# Patient Record
Sex: MALE | Race: WHITE | NOT HISPANIC OR LATINO | Employment: OTHER | URBAN - METROPOLITAN AREA
[De-identification: names, ages, dates, MRNs, and addresses within clinical notes are randomized per-mention and may not be internally consistent; named-entity substitution may affect disease eponyms.]

---

## 2020-05-12 ENCOUNTER — OFFICE VISIT (OUTPATIENT)
Dept: ORTHOPEDIC SURGERY | Facility: CLINIC | Age: 60
End: 2020-05-12

## 2020-05-12 VITALS — OXYGEN SATURATION: 100 % | HEIGHT: 69 IN | HEART RATE: 68 BPM | BODY MASS INDEX: 31.28 KG/M2 | WEIGHT: 211.2 LBS

## 2020-05-12 DIAGNOSIS — M65.312 TRIGGER FINGER OF LEFT THUMB: ICD-10-CM

## 2020-05-12 DIAGNOSIS — M79.642 BILATERAL HAND PAIN: Primary | ICD-10-CM

## 2020-05-12 DIAGNOSIS — M65.352 TRIGGER LITTLE FINGER OF LEFT HAND: ICD-10-CM

## 2020-05-12 DIAGNOSIS — M79.641 BILATERAL HAND PAIN: Primary | ICD-10-CM

## 2020-05-12 DIAGNOSIS — M19.042 PRIMARY OSTEOARTHRITIS OF BOTH HANDS: ICD-10-CM

## 2020-05-12 DIAGNOSIS — M19.041 PRIMARY OSTEOARTHRITIS OF BOTH HANDS: ICD-10-CM

## 2020-05-12 PROCEDURE — 99203 OFFICE O/P NEW LOW 30 MIN: CPT | Performed by: PHYSICIAN ASSISTANT

## 2020-05-12 RX ORDER — LEVOTHYROXINE SODIUM 0.05 MG/1
TABLET ORAL DAILY
COMMUNITY
Start: 2017-01-10 | End: 2021-09-03 | Stop reason: SDUPTHER

## 2020-05-12 RX ORDER — LISINOPRIL 20 MG/1
20 TABLET ORAL DAILY
COMMUNITY
End: 2021-02-15 | Stop reason: SINTOL

## 2020-05-12 RX ORDER — FAMOTIDINE 20 MG/1
20 TABLET, FILM COATED ORAL DAILY
COMMUNITY
Start: 2020-05-03 | End: 2022-03-28

## 2020-05-12 RX ORDER — IBUPROFEN 200 MG
200 TABLET ORAL EVERY 6 HOURS PRN
COMMUNITY
End: 2022-03-28

## 2020-05-12 RX ORDER — ASPIRIN 81 MG/1
81 TABLET, CHEWABLE ORAL DAILY
COMMUNITY

## 2020-05-12 RX ORDER — ROSUVASTATIN CALCIUM 10 MG/1
TABLET, COATED ORAL
COMMUNITY
Start: 2018-07-16 | End: 2020-11-30 | Stop reason: SDUPTHER

## 2020-05-12 NOTE — PROGRESS NOTES
[facility] Note    Subjective     Chief Complaint   Patient presents with   • Right Hand - Pain   • Left Hand - Pain        HPI  Alfredo Oconnor is a 60 y.o. male.  Right-hand-dominant.  Patient comes in complaining of bilateral hand pain.  He states it has been ongoing for over 6 months.  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  Since finishing the construction work, he reports his symptoms have improved.  At this time he endorses a pain scale 3/10.  Severity the pain mild to moderate.  Quality the pain dull.  Associated symptoms intermittent swelling, popping of joints, some catching/triggering to left little finger and intermittently left thumb.  No reported numbness or tingling into the extremities or digits.  Patient does take ibuprofen as needed.    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    Patient reports history of diabetes and states his A1c is 8.5.  He quit smoking in .    No reported fever, chills, night sweats or other constitutional symptoms.    Past Medical History:   Diagnosis Date   • Diabetes (CMS/[facility])       Past Surgical History:   Procedure Laterality Date   • CATARACT EXTRACTION     • VASECTOMY        History reviewed. No pertinent family history.  Social History     Socioeconomic History   • Marital status:      Spouse name: Not on file   • Number of children: Not on file   • Years of education: Not on file   • Highest education level: Not on file   Tobacco Use   • Smoking status: Former Smoker     Start date:      Last attempt to quit:      Years since quittin.3   • Smokeless tobacco: Never Used   Substance and Sexual Activity   • Alcohol use: Yes     Comment: occasional   • Drug use: Never   • Sexual activity: Defer      Current Outpatient Medications on File Prior to Visit   Medication  "Sig Dispense Refill   • aspirin 81 MG chewable tablet Chew 81 mg Daily.     • famotidine (PEPCID) 20 MG tablet Take 20 mg by mouth Daily.     • glucose blood (ONE TOUCH ULTRA TEST) test strip 1 strip 4 (Four) Times a Day.     • ibuprofen (ADVIL,MOTRIN) 200 MG tablet Take 200 mg by mouth Every 6 (Six) Hours As Needed for Mild Pain .     • levothyroxine (SYNTHROID, LEVOTHROID) 50 MCG tablet      • lisinopril (PRINIVIL,ZESTRIL) 20 MG tablet Take 20 mg by mouth Daily.     • NOVOLOG 100 UNIT/ML injection INJECT AS DIRECTED IN INSULIN PUMP MAXIMUM  UNITS PER DAY.     • rosuvastatin (CRESTOR) 10 MG tablet TAKE 1 TABLET BY MOUTH ONCE DAILY     • VITAMIN D PO Take  by mouth.       No current facility-administered medications on file prior to visit.       No Known Allergies     The following portions of the patient's history were reviewed and updated as appropriate: allergies, current medications, past family history, past medical history, past social history, past surgical history and problem list.    Review of Systems   Constitutional: Positive for fatigue.   HENT: Positive for hearing loss.    Eyes: Negative.    Respiratory: Negative.    Cardiovascular: Negative.    Gastrointestinal: Negative.    Endocrine: Negative.    Genitourinary: Negative.    Musculoskeletal: Positive for arthralgias and back pain.   Skin: Negative.    Allergic/Immunologic: Negative.    Neurological: Negative.    Hematological: Negative.    Psychiatric/Behavioral: Negative.         Objective      Physical Exam  Pulse 68   Ht 175.3 cm (69\")   Wt 95.8 kg (211 lb 3.2 oz)   SpO2 100%   BMI 31.19 kg/m²     Body mass index is 31.19 kg/m².    GENERAL APPEARANCE: awake, alert & oriented x 3, in no acute distress and well developed, well nourished  PSYCH: normal mood and affect  LUNGS:  breathing nonlabored, no wheezing  EYES: sclera anicteric, pupils equal  CARDIOVASCULAR: palpable radial pulses bilaterally. Capillary refill less than 2 " seconds  INTEGUMENTARY: skin intact, no clubbing, cyanosis  NEUROLOGIC:  Normal Sensation         Ortho Exam  Peripheral Vascular   Bilateral Upper Extremity    No cyanotic nail beds    Pink nail beds and rapid capillary refill   Palpation    Radial Pulse - Bilaterally normal    Neurologic   Sensory: Light touch intact- Right and left hand    Left Upper Extremity    Left wrist extensors: 5/5    Left wrist flexors: 5/5    Left intrinsics: 5/5   Right Upper Extremity    Right wrist extensors: 5/5    Right wrist flexors: 5/5    Right intrinsics: 5/5    Musculoskeletal   Left Elbow    Forearm supination: AROM - 75 degrees    Forearm pronation: AROM - 75 degrees   Right Elbow    Forearm supination: AROM - 75 degrees    Forearm pronation: AROM - 75 degrees     Inspection and Palpation   Right Wrist      Tenderness -generalized tenderness noted throughout wrist into digits.    Swelling - none    Crepitus - none    Muscle tone - no atrophy   Left Wrist    Tenderness - generalized tenderness noted throughout wrist into digits.    Swelling - none    Crepitus - none    Muscle tone - no atrophy     ROJM:   Left Wrist    Flexion: AROM - 65 degrees    Extension: AROM - 65 degrees   Right Wrist    Flexion: AROM - 65 degrees    Extension: AROM - 65 degrees     Deformities, Malalignments, Discrepancies    None     Functional Testing   Right Wrist    Tinel's Sign negative    Phalen's Sign negative    Carpal Compression Test negative   Left Wrist    Tinel's Sign negative    Phalen's Sign negative    Carpal Compression Test negative       Strength and Tone    Right  strength: good    Left  strength: good     Hand Exam: Decreased range of motion all digits with stiffness noted to all digits.  Patient is able to make composite fist but does note stiffness when performing.  Patient reports this is his typical baseline.    Left small finger positive triggering/catching but does not have to manually unlock.  Positive tenderness at  level of the A1 pulley.  Left thumb negative triggering but tenderness noted at level A1 pulley.  Finkelstein's negative.  Axial load negative.    Right small finger with mild tenderness noted at the level A1 pulley.  No triggering or catching appreciable on exam today.      Imaging/Studies  Ordered bilateral hands plain films.  Imaging read by Dr. Simmons.    Imaging Results (Last 7 Days)     Procedure Component Value Units Date/Time    XR Hand 3+ View Bilateral [[id number]] Resulted:  05/12/20 0958     Updated:  05/12/20 0959    Narrative:       Bilateral Hand X-Ray    Indication: Pain    Views:  AP, Lateral, and Oblique     Comparison: None    Findings:  No fracture  No bony lesion  Normal soft tissues with the exception of some calcification of the small   vessels in each hand.  Normal joint spaces  Left wrist shows prominence and likely posttraumatic changes of the ulnar   styloid.  Diffuse mild to moderate degenerative changes in the IP joints    Impression:   Posttraumatic changes left ulnar styloid  Degenerative changes throughout the hand  No acute bony abnormality noted                Assessment/Plan        ICD-10-CM ICD-9-CM   1. Bilateral hand pain M79.641 729.5    M79.642    2. Primary osteoarthritis of both hands M19.041 715.14    M19.042    3. Trigger little finger of left hand M65.352 727.03   4. Trigger finger of left thumb M65.312 727.03       Orders Placed This Encounter   Procedures   • XR Hand 3+ View Bilateral        -Bilateral hand pain due to bilateral hand osteoarthritis--encourage gentle range of motion and stretching exercises.  -Left little finger and left thumb trigger digits--discussed corticosteroid injection to the A1 pulley.  Patient declined at this time since he has noticed improvement after finishing his construction work.  -Patient is currently taking ibuprofen.  Due to his diabetes with an A1c of over 8.5 recommend PCP continue to monitor kidney function and prescribe NSAIDs as  needed.  -We also discussed the importance of getting his diabetes under better control.  He reports that 8.5 is the best he can do and states that he does have fluctuations of up to 200 points on a daily basis with his glucose control.  He will continue working with his PCP.  -Patient will follow-up as needed.  He will contact the clinic if he changes his mind regarding an injection to left little finger or thumb A1 pulley.  We will also contact the clinic if the right little finger becomes more symptomatic and he wishes to have an injection to it.  -Questions and concerns answered.    Case discussed with Dr. Simmons agrees with the above assessment and plan.    Medical Decision Making  Management Options : over-the-counter medicine  Data/Risk: radiology tests    Gracy Ivory PA-C  05/12/20  11:52         EMR Dragon/Transcription disclaimer:  Much of this encounter note is an electronic transcription of spoken language to printed text. Electronic transcription of spoken language may permit erroneous, or at times, nonsensical words or phrases to be inadvertently transcribed. Although I have reviewed the note for such errors, some may still exist.

## 2020-10-20 ENCOUNTER — OFFICE VISIT (OUTPATIENT)
Dept: ORTHOPEDIC SURGERY | Facility: CLINIC | Age: 60
End: 2020-10-20

## 2020-10-20 VITALS — BODY MASS INDEX: 32.88 KG/M2 | HEIGHT: 69 IN | WEIGHT: 222 LBS

## 2020-10-20 DIAGNOSIS — M75.00 DIABETIC FROZEN SHOULDER ASSOCIATED WITH TYPE 1 DIABETES MELLITUS (HCC): Primary | ICD-10-CM

## 2020-10-20 DIAGNOSIS — M75.111 NONTRAUMATIC INCOMPLETE TEAR OF RIGHT ROTATOR CUFF: ICD-10-CM

## 2020-10-20 DIAGNOSIS — M75.51 BURSITIS OF RIGHT SHOULDER: ICD-10-CM

## 2020-10-20 DIAGNOSIS — E10.618 DIABETIC FROZEN SHOULDER ASSOCIATED WITH TYPE 1 DIABETES MELLITUS (HCC): Primary | ICD-10-CM

## 2020-10-20 DIAGNOSIS — M75.41 IMPINGEMENT SYNDROME OF RIGHT SHOULDER: ICD-10-CM

## 2020-10-20 DIAGNOSIS — M24.511 CONTRACTURE OF JOINT OF RIGHT SHOULDER REGION: ICD-10-CM

## 2020-10-20 PROBLEM — S43.431A SUPERIOR GLENOID LABRUM LESION OF RIGHT SHOULDER: Status: ACTIVE | Noted: 2020-10-20

## 2020-10-20 PROBLEM — M75.21 BICEPS TENDINITIS OF RIGHT UPPER EXTREMITY: Status: ACTIVE | Noted: 2020-10-20

## 2020-10-20 PROCEDURE — 99214 OFFICE O/P EST MOD 30 MIN: CPT | Performed by: ORTHOPAEDIC SURGERY

## 2020-10-20 PROCEDURE — 20610 DRAIN/INJ JOINT/BURSA W/O US: CPT | Performed by: ORTHOPAEDIC SURGERY

## 2020-10-20 RX ORDER — LIDOCAINE HYDROCHLORIDE 10 MG/ML
5 INJECTION, SOLUTION EPIDURAL; INFILTRATION; INTRACAUDAL; PERINEURAL
Status: COMPLETED | OUTPATIENT
Start: 2020-10-20 | End: 2020-10-20

## 2020-10-20 RX ORDER — METHYLPREDNISOLONE ACETATE 80 MG/ML
80 INJECTION, SUSPENSION INTRA-ARTICULAR; INTRALESIONAL; INTRAMUSCULAR; SOFT TISSUE
Status: COMPLETED | OUTPATIENT
Start: 2020-10-20 | End: 2020-10-20

## 2020-10-20 RX ORDER — CELECOXIB 200 MG/1
CAPSULE ORAL DAILY
COMMUNITY
Start: 2020-10-05

## 2020-10-20 RX ADMIN — METHYLPREDNISOLONE ACETATE 80 MG: 80 INJECTION, SUSPENSION INTRA-ARTICULAR; INTRALESIONAL; INTRAMUSCULAR; SOFT TISSUE at 09:18

## 2020-10-20 RX ADMIN — LIDOCAINE HYDROCHLORIDE 5 ML: 10 INJECTION, SOLUTION EPIDURAL; INFILTRATION; INTRACAUDAL; PERINEURAL at 09:18

## 2020-10-20 NOTE — PROGRESS NOTES
[facility] [place] [facility] Office Visit - Per Carrasco MD    Office Visit       Patient Name: Alfredo Oconnor    Chief Complaint:   Chief Complaint   Patient presents with   • Right Shoulder - Pain       Referring Physician: No ref. provider found    History of Present Illness:   Alfredo Oconnor is a 60 y.o. male who presents with right body part: shoulder Reason: pain.  Onset:Onset: atraumatic and gradual in nature. The issue has been ongoing for 6 month(s). Pain is a 10/10 on the pain scale. Pain is described as Pain Characterization: stabbing. Associated symptoms include Symptoms: popping and stiffness. The pain is worse with sleeping and working; resting improve the pain. Previous treatments have included: NSAIDS and physical therapy.  Right shoulder pain he rates it 10 of 10.    I have reviewed the patient's history of present illness as noted/entered above.    I have reviewed the patient's past medical history, surgical history, social history, family history, medications, and allergies as noted in the electronic medical record and as noted/entered.  I have reviewed the patient's review of systems as noted/enter and updated as noted in the patient's HPI.      He presents with an outside hospital MRI completed from [facility] completed on 10/8/2020 of the right shoulder.    Pain 6 months  10 of 10 pain  Treated with physical therapy and NSAIDs. 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 Negative for chills and fever.   HENT: Negative.  Negative for  congestion and dental problem.    Eyes: Negative.  Negative for blurred vision.   Respiratory: Negative.  Negative for shortness of breath.    Cardiovascular: Negative.  Negative for leg swelling.   Gastrointestinal: Negative.  Negative for abdominal pain.   Endocrine: Negative.  Negative for polyuria.   Genitourinary: Positive for difficulty urinating.   Musculoskeletal: Positive for arthralgias and back pain.   Skin: Negative.    Allergic/Immunologic: Negative.    Neurological: Negative.    Hematological: Negative.  Negative for adenopathy.   Psychiatric/Behavioral: Negative.  Negative for behavioral problems.        Past Medical History:   Past Medical History:   Diagnosis Date   • Diabetes (CMS/H[facility])        Past Surgical History:   Past Surgical History:   Procedure Laterality Date   • CATARACT EXTRACTION     • VASECTOMY         Family History:   Family History   Problem Relation Age of Onset   • Hypertension Father        Social History:   Social History     Socioeconomic History   • Marital status:      Spouse name: Not on file   • Number of children: Not on file   • Years of education: Not on file   • Highest education level: Not on file   Tobacco Use   • Smoking status: Former Smoker     Start date:      Quit date:      Years since quittin.8   • Smokeless tobacco: Never Used   Substance and Sexual Activity   • Alcohol use: Yes     Comment: occasional   • Drug use: Never   • Sexual activity: Defer       Medications:   Current Outpatient Medications:   •  aspirin 81 MG chewable tablet, Chew 81 mg Daily., Disp: , Rfl:   •  celecoxib (CeleBREX) 200 MG capsule, , Disp: , Rfl:   •  famotidine (PEPCID) 20 MG tablet, Take 20 mg by mouth Daily., Disp: , Rfl:   •  glucose blood (ONE TOUCH ULTRA TEST) test strip, 1 strip 4 (Four) Times a Day., Disp: , Rfl:   •  ibuprofen (ADVIL,MOTRIN) 200 MG tablet, Take 200 mg by mouth Every 6 (Six) Hours As Needed for Mild Pain ., Disp: , Rfl:   •  levothyroxine  "(SYNTHROID, LEVOTHROID) 50 MCG tablet, , Disp: , Rfl:   •  lisinopril (PRINIVIL,ZESTRIL) 20 MG tablet, Take 20 mg by mouth Daily., Disp: , Rfl:   •  NovoLOG 100 UNIT/ML injection,  UNITS DAILY IN AN INSULIN PUMP, Disp: 30 mL, Rfl: 1  •  rosuvastatin (CRESTOR) 10 MG tablet, TAKE 1 TABLET BY MOUTH ONCE DAILY, Disp: , Rfl:   •  VITAMIN D PO, Take  by mouth., Disp: , Rfl:     Allergies: No Known Allergies    The following portions of the patient's history were reviewed and updated as appropriate: allergies, current medications, past family history, past medical history, past social history, past surgical history and problem list.        Objective    Objective      Vital Signs:   Vitals:    10/20/20 0850   Weight: 101 kg (222 lb)   Height: 175.3 cm (69.02\")       Ortho Exam:  General: no acute distress, comfortable  Vitals reviewed in chart  Head: normocephalic, atraumatic  Ears: no external drainage noted  Eyes: extraocular muscles appear intact with good tracking  Psych: alert and oriented, normal appearing mood  Vascular: 2+ pulses symmetric, well perfused  Neurologic:  Sensation to light touch intact distally  Dermatologic: skin not hot/red/swollen  Respiratory: breathing comfortably on room air, no intercostal retractions  Cardiovascular: regular rate and rhythm, well perfused      Musculoskeletal Exam    SIDE: RIGHT  Shoulder Exam:  Range of motion measurements (degrees)  Forward flexion/Abduction/External rotation at side/ER at 90/IR at 90/IR position  Active: 150/130/30/70/40/buttock  Passive: 150/150/40/70/40/buttock    Diminished internal rotation and external rotation  Painful arc of motion  No evidence of septic joint  Pain with forward flexion and abduction greater than 90  Impingement testing Neer's test - positive/painful  Impingement testing Hawkin's test - positive/painful  Rotator cuff testing Trinity's test - mild pain but no obvious weakness, pain limited  Rotator cuff testing External rotation - " no weakness  Rotator cuff testing Lag signs - absent  Rotator cuff testing Belly press - negative  Scapular dyskinesis - present, abnormal scapular motion      Results Review:   Imaging Results (Last 24 Hours)     ** No results found for the last 24 hours. **        Have reviewed the report and personally reviewed the MRI on a CD from [facility] completed on 10/18/2020.  Right shoulder MRI without contrast.  Findings:  Partial articular sided tearing anteriorly of the supraspinatus high-grade  Bursal sided fraying of the infraspinatus  Small amount intrasubstance fraying of the subscapularis on my read with tendinopathy  Biceps tendon is intact but some flattening in the area of partial articular sided tearing of the subscapularis  Superior labrum degenerative changes noted  AC joint arthritis is noted  Type III acromion    Procedures     RIGHT SHOULDER SUBACROMIAL SPACE INJECTION: Risks and benefits of a shoulder biceps subacromial space injection were discussed and the patient desired to proceed. Verbal consent was obtained. The patient understood the risk of infection, potential skin changes, bump in blood glucose especially with diabetes, nerve injury, possibility of increased pain in the short term, and possible incomplete pain relief.  Using sterile technique, the shoulder subacromial space was injected from a posterior approach with 1mL of 80mg/mL Depo Medrol and 4cc of lidocaine with aspiration prior to injection. The patient tolerated the procedure without difficulty.  CPT CODE 68172 for major joint aspiration/injection          Assessment / Plan      Assessment/Plan:   Problem List Items Addressed This Visit        Endocrine    Diabetic frozen shoulder associated with type 1 diabetes mellitus (CMS/[facility]) - Primary    Relevant Medications    NovoLOG 100 UNIT/ML injection    Other Relevant Orders    Ambulatory Referral to Physical Therapy (Completed)       Musculoskeletal and Integument     Nontraumatic incomplete tear of right rotator cuff    Relevant Orders    Ambulatory Referral to Physical Therapy (Completed)    Bursitis of right shoulder    Relevant Orders    Ambulatory Referral to Physical Therapy (Completed)    Contracture of joint of right shoulder region    Relevant Orders    Ambulatory Referral to Physical Therapy (Completed)       Other    Impingement syndrome of right shoulder    Relevant Orders    Ambulatory Referral to Physical Therapy (Completed)          Right diabetic frozen shoulder     Selective rest/activity modifications recommended while the shoulder recovers, although stretching and physical therapy are encouraged.    Medrol dosepak -we will hold on prescribing this given his history of diabetes    NSAIDs -he is already on Celebrex    Physical therapy prescription provided and stretching program discussed    Steroid injection - a steroid injection can be beneficial and was offered.  Risks and benefits were discussed including a bump in blood glucose in the case of Diabetes.  He desired an injection today which was completed    Follow-up - the patient can schedule an appointment for 2 months pending progress with the nonoperative treatment program    Patient counseling was performed with a discussion of the following:  What is a frozen shoulder?  Frozen Shoulder or Idiopathic Adhesive Capsulitis - the patient has a diagnosis of idiopathic adhesive capsulitis or “frozen shoulder.”  We discussed that this condition can have variable “freezing” and “thawing” phases that can be very painful.  Fortunately, this condition rarely requires operative intervention and responds to conservative measures gradually over time.  Unfortunately, I did  that the symptoms can last up to 6-12 months in some patients and frozen shoulder can return to the same shoulder or impact the other shoulder in the future.    What is our plan to help nonoperatively treat frozen shoulder?  We discussed a  plan for selective rest/activity modification, NSAIDs - risks and benefits of these medications discussed, frozen shoulder exercises demonstrated with emphasis on range of motion and physical therapy prescription given, and the option of a corticosteroid injection.  The patient may be a candidate for a 6-day Medrol dose pack and then start an NSAID after the Medrol dose pack is finished.      When will I see the patient back?  I will see the patient back in 2 months to follow-up their progress pending progress or sooner if needed.  If the patient is improved then they can call to cancel the appointment.  If the patient has improved range of motion, but continued pain, then we will look for other potential causes of shoulder pain at the follow-up appointment.  The ghosh now is to improve the range of motion and gradually decrease the pain they are experiencing.      Follow Up:   Return in about 2 months (around 12/20/2020) for Recheck.        Per Carrasco MD, FAAOS  Orthopedic Surgeon  Fellowship Trained Shoulder and Elbow Surgeon  [facility]  Orthopedics and Sports Medicine  [address] [place] [address], [address]  [place] [address]    10/20/20  09:18 EDT    Please note that portions of this note may have been completed with a voice recognition program. Efforts were made to edit the dictations, but occasionally words are mistranscribed.

## 2020-10-20 NOTE — PROGRESS NOTES
Procedure   Large Joint Arthrocentesis: R subacromial bursa  Date/Time: 10/20/2020 9:18 AM  Consent given by: patient  Site marked: site marked  Timeout: Immediately prior to procedure a time out was called to verify the correct patient, procedure, equipment, support staff and site/side marked as required   Supporting Documentation  Indications: pain   Procedure Details  Location: shoulder - R subacromial bursa  Preparation: Patient was prepped and draped in the usual sterile fashion  Needle size: 22 G  Approach: posterior  Medications administered: 5 mL lidocaine PF 1% 1 %; 80 mg methylPREDNISolone acetate 80 MG/ML  Patient tolerance: patient tolerated the procedure well with no immediate complications

## 2020-11-03 ENCOUNTER — OFFICE VISIT (OUTPATIENT)
Dept: ORTHOPEDIC SURGERY | Facility: CLINIC | Age: 60
End: 2020-11-03

## 2020-11-03 VITALS — WEIGHT: 222 LBS | OXYGEN SATURATION: 100 % | HEIGHT: 69 IN | BODY MASS INDEX: 32.88 KG/M2 | HEART RATE: 70 BPM

## 2020-11-03 DIAGNOSIS — E11.65 TYPE 2 DIABETES MELLITUS WITH HYPERGLYCEMIA, WITH LONG-TERM CURRENT USE OF INSULIN (HCC): ICD-10-CM

## 2020-11-03 DIAGNOSIS — M65.352 TRIGGER LITTLE FINGER OF LEFT HAND: Primary | ICD-10-CM

## 2020-11-03 DIAGNOSIS — M65.351 TRIGGER LITTLE FINGER OF RIGHT HAND: ICD-10-CM

## 2020-11-03 DIAGNOSIS — Z79.4 TYPE 2 DIABETES MELLITUS WITH HYPERGLYCEMIA, WITH LONG-TERM CURRENT USE OF INSULIN (HCC): ICD-10-CM

## 2020-11-03 PROCEDURE — 20550 NJX 1 TENDON SHEATH/LIGAMENT: CPT | Performed by: PHYSICIAN ASSISTANT

## 2020-11-03 PROCEDURE — 99213 OFFICE O/P EST LOW 20 MIN: CPT | Performed by: PHYSICIAN ASSISTANT

## 2020-11-03 RX ADMIN — TRIAMCINOLONE ACETONIDE 20 MG: 40 INJECTION, SUSPENSION INTRA-ARTICULAR; INTRAMUSCULAR at 10:16

## 2020-11-03 RX ADMIN — LIDOCAINE HYDROCHLORIDE 0.5 ML: 10 INJECTION, SOLUTION INFILTRATION; PERINEURAL at 10:16

## 2020-11-03 NOTE — PROGRESS NOTES
"    [facility] Orthopaedic Surgery Clinic Note        Subjective     CC: Follow-up (Bilateral hand pain 5 month follow up)      RICHA Oconnor is a 60 y.o. male.  Patient returns today for follow-up bilateral hands.  He was noted to have trigger digits 2 bilateral A1 pulley small finger as well as left thumb.  We had discussed possible corticosteroid injections at his last visit in May 2020 but since his symptoms were improving he declined.  He now is requesting injections because symptoms have worsened.  He notes triggering to both right and left small fingers at this time.      Currently he endorses a pain scale of 6/10.  Severity the pain moderate when fingers trigger/catch.  No reported numbness or tingling into the digits.    Overall, patient's symptoms are worse.  Once again patient is diabetic and he reports his A1c is between 8-9.    ROS:    Constiutional:Pt denies fever, chills, nausea, or vomiting.  MSK:as above        Objective      Past Medical History  Past Medical History:   Diagnosis Date   • Diabetes (CMS/[facility])          Physical Exam  Pulse 70   Ht 175.3 cm (69.02\")   Wt 101 kg (222 lb)   SpO2 100%   BMI 32.77 kg/m²     Body mass index is 32.77 kg/m².    Patient is well nourished and well developed.        Ortho Exam  Bilateral small fingers  Skin: intact without rash, redness, warmth, soft tissue swelling.  Tenderness: (+)  over region A1 pulley right and left small fingers, thickening noted  ROM: FROM finger with triggering reproduced during exam  FDS, FDP intact  Motor: intact R/U/M/AIN/PIN  Sensory: intact R/U/M  Vascular: 2+ radial pulse, brisk CR each digit       Imaging/Labs/EMG Reviewed:  No new imaging today.      Assessment:  1. Trigger little finger of left hand    2. Trigger little finger of right hand    3. Type 2 diabetes mellitus with hyperglycemia, with long-term current use of insulin (CMS/[facility])        Plan:  1. Bilateral small finger A1 pulley trigger digits--offered and accepted " corticosteroid injections.  Injections given today.  2. Patient may continue taking Celebrex as directed by his PCP.  3. Diabetes--continue working with PCP to get diabetes/blood sugars under better control.  4. Follow-up 2 months for repeat evaluation, sooner if issues arise or symptoms worsen/change.  5. Questions and concerns answered.    After discussing the risks, benefits, indications of injection, the patient gave consent to proceed.  Bilateral small finger A1 pulleys was confirmed as the correct sites to be injected with a timeout.  Each was then prepped using Hibiclens and injected with a mixture of 1/2 cc of 1% plain lidocaine and 1/2 cc of Kenalog (40 mg per mL), without any resistance through the volar approach, patient in seated position.  Area was cleaned, hemostasis was achieved and a Band-Aid was applied over the injection site.  The patient tolerated procedure well.  I instructed the patient on signs and symptoms of infection.  They should report to the emergency department or return to clinic if any of these develop, for further evaluation and treatment.  Recommended modifying activity for the next 48 hours to include rest, ice, elevation and oral pain medication as needed.  Discussed postinjection pain control and blood sugar monitoring.      Gracy Ivory PA-C  11/06/20  07:58 EST      Dragon disclaimer:  Much of this encounter note is an electronic transcription/translation of spoken language to printed text. The electronic translation of spoken language may permit erroneous, or at times, nonsensical words or phrases to be inadvertently transcribed; Although I have reviewed the note for such errors, some may still exist.

## 2020-11-03 NOTE — PROGRESS NOTES
Procedure   Small Joint Arthrocentesis A1 pulley cortisone injection   Consent given by: patient  Site marked: site marked  Timeout: Immediately prior to procedure a time out was called to verify the correct patient, procedure, equipment, support staff and site/side marked as required   Supporting Documentation  Indications: pain   Procedure Details  Location: small finger (A1 pulley ) -   Preparation: Patient was prepped and draped in the usual sterile fashion  Needle size: 25 G (short )  Approach: dorsal  Medications administered: 0.5 mL lidocaine 1 %; 20 mg triamcinolone acetonide 40 MG/ML  Patient tolerance: patient tolerated the procedure well with no immediate complications    Small Joint Arthrocentesis A1 pulley cortisone injection  Consent given by: patient  Site marked: site marked  Timeout: Immediately prior to procedure a time out was called to verify the correct patient, procedure, equipment, support staff and site/side marked as required   Supporting Documentation  Indications: pain   Procedure Details  Location: small finger (A1 pulley ) -   Preparation: Patient was prepped and draped in the usual sterile fashion  Needle size: 25 G (short )  Approach: dorsal  Medications administered: 0.5 mL lidocaine 1 %; 20 mg triamcinolone acetonide 40 MG/ML  Patient tolerance: patient tolerated the procedure well with no immediate complications

## 2020-11-06 RX ORDER — LIDOCAINE HYDROCHLORIDE 10 MG/ML
0.5 INJECTION, SOLUTION INFILTRATION; PERINEURAL
Status: COMPLETED | OUTPATIENT
Start: 2020-11-03 | End: 2020-11-03

## 2020-11-06 RX ORDER — TRIAMCINOLONE ACETONIDE 40 MG/ML
20 INJECTION, SUSPENSION INTRA-ARTICULAR; INTRAMUSCULAR
Status: COMPLETED | OUTPATIENT
Start: 2020-11-03 | End: 2020-11-03

## 2020-11-12 ENCOUNTER — OFFICE VISIT (OUTPATIENT)
Dept: ENDOCRINOLOGY | Facility: CLINIC | Age: 60
End: 2020-11-12

## 2020-11-12 VITALS
BODY MASS INDEX: 31.55 KG/M2 | HEART RATE: 64 BPM | WEIGHT: 220.4 LBS | SYSTOLIC BLOOD PRESSURE: 120 MMHG | HEIGHT: 70 IN | DIASTOLIC BLOOD PRESSURE: 72 MMHG

## 2020-11-12 DIAGNOSIS — E03.9 PRIMARY HYPOTHYROIDISM: ICD-10-CM

## 2020-11-12 DIAGNOSIS — Z96.41 INSULIN PUMP IN PLACE: ICD-10-CM

## 2020-11-12 DIAGNOSIS — E10.65 UNCONTROLLED TYPE 1 DIABETES MELLITUS WITH HYPERGLYCEMIA (HCC): Primary | ICD-10-CM

## 2020-11-12 DIAGNOSIS — I10 BENIGN HYPERTENSION: ICD-10-CM

## 2020-11-12 DIAGNOSIS — E10.649 TYPE 1 DIABETES MELLITUS WITH HYPOGLYCEMIA AND WITHOUT COMA (HCC): ICD-10-CM

## 2020-11-12 PROBLEM — E55.9 VITAMIN D DEFICIENCY: Status: ACTIVE | Noted: 2020-11-12

## 2020-11-12 PROBLEM — E78.2 MIXED HYPERLIPIDEMIA: Status: ACTIVE | Noted: 2020-11-12

## 2020-11-12 LAB
EXPIRATION DATE: NORMAL
HBA1C MFR BLD: 9.3 %
Lab: NORMAL

## 2020-11-12 PROCEDURE — 95251 CONT GLUC MNTR ANALYSIS I&R: CPT | Performed by: INTERNAL MEDICINE

## 2020-11-12 PROCEDURE — 99214 OFFICE O/P EST MOD 30 MIN: CPT | Performed by: INTERNAL MEDICINE

## 2020-11-12 PROCEDURE — 83036 HEMOGLOBIN GLYCOSYLATED A1C: CPT | Performed by: INTERNAL MEDICINE

## 2020-11-12 NOTE — PROGRESS NOTES
"     Office Note      Date: 2020  Patient Name: Alfredo Oconnor  MRN: [id number]  : 1960    Chief Complaint   Patient presents with   • Diabetes       History of Present Illness:   Alfredo Oconnor is a 60 y.o. male who presents for Diabetes type 1. Diagnosed in: . Treated in past with insulin. Current treatments: [employer] insulin pump with DexCom G6 CGM with basal IIQ. Number of insulin shots per day: none - on pump. Checks blood sugar none - on CGM. Has low blood sugar: occasional. Aspirin use: Yes. Statin use: Yes. ACE-I/ARB use: Yes. Changes in health since last visit: none. Last eye exam 2018.    He is taking T4 50mcg qd. He is taking this in the evening after a snack. He c/o fatigue. He notes dry skin. He hasn't noted any change in the size of his neck. He denies any compressive sxs.    Subjective      Diabetic Complications:  Eyes: No  Kidneys: No  Feet: No  Heart: No    Diet and Exercise:  Meals per day: 3  Minutes of exercise per week: 0 mins.    Review of Systems:   Review of Systems   Constitutional: Negative.    Cardiovascular: Negative.    Gastrointestinal: Negative.    Endocrine: Negative.        The following portions of the patient's history were reviewed and updated as appropriate: allergies, current medications, past family history, past medical history, past social history, past surgical history and problem list.    Objective       Visit Vitals  /72 (BP Location: Left arm, Patient Position: Sitting, Cuff Size: Adult)   Pulse 64   Ht 177.8 cm (70\")   Wt 100 kg (220 lb 6.4 oz)   BMI 31.62 kg/m²       Physical Exam:  Physical Exam  Constitutional:       Appearance: Normal appearance.   Neurological:      Mental Status: He is alert.         Labs:    HbA1c  Lab Results   Component Value Date    HGBA1C 9.3 2020       CMP  No results found for: GLUCOSE, BUN, CREATININE, EGFRIFNONA, EGFRIFAFRI, BCR, K, CO2, CALCIUM, PROTENTOTREF, LABIL2, BILIRUBIN, AST, ALT     Lipid Panel    "     TSH  No results found for: TSH, FREET4     Hemoglobin A1C  Lab Results   Component Value Date    HGBA1C 9.3 11/12/2020        Microalbumin/Creatinine  No results found for: MALBCRERATIO, CREATINIURIN, MICROALBUR        Assessment / Plan      Assessment & Plan:  Problem List Items Addressed This Visit        Cardiovascular and Mediastinum    Benign hypertension    Current Assessment & Plan     Hypertension is unchanged.  Continue current treatment regimen.  Blood pressure will be reassessed in 3 months.         Relevant Medications    lisinopril (PRINIVIL,ZESTRIL) 20 MG tablet       Endocrine    Uncontrolled type 1 diabetes mellitus with hyperglycemia (CMS/HCC) - Primary    Current Assessment & Plan     Diabetes is unchanged.   Continue current treatment regimen.  Diabetes will be reassessed in 3 months.    We discussed upgrade to Control IQ. He will contact [employer] about this.    CGM shows postprandial spikes.  Will adjust CHO ratio.      A1c above goal.  Had steroid injections recently in shoulder and fingers.         Relevant Medications    NovoLOG 100 UNIT/ML injection    Other Relevant Orders    POC Glycosylated Hemoglobin (Hb A1C) (Completed)    Type 1 diabetes mellitus with hypoglycemia (CMS/HCC)    Current Assessment & Plan     Continue with DexCom and pump.    Decrease MN basal due to having to eat bedtime snack to avoid lows.         Relevant Medications    NovoLOG 100 UNIT/ML injection       Other    Primary hypothyroidism    Current Assessment & Plan     Plan to recheck TSH next visit.         Insulin pump in place           Return in about 3 months (around 2/12/2021) for Recheck with A1c and TSH.    Zhao Yung MD   11/12/2020

## 2020-11-12 NOTE — ASSESSMENT & PLAN NOTE
Diabetes is unchanged.   Continue current treatment regimen.  Diabetes will be reassessed in 3 months.    We discussed upgrade to Control IQ. He will contact [employer] about this.    CGM shows postprandial spikes.  Will adjust CHO ratio.      A1c above goal.  Had steroid injections recently in shoulder and fingers.

## 2020-11-12 NOTE — ASSESSMENT & PLAN NOTE
Continue with DexCom and pump.    Decrease MN basal due to having to eat bedtime snack to avoid lows.

## 2020-11-30 RX ORDER — ROSUVASTATIN CALCIUM 10 MG/1
10 TABLET, COATED ORAL DAILY
Qty: 90 TABLET | Refills: 1 | Status: SHIPPED | OUTPATIENT
Start: 2020-11-30 | End: 2021-12-17 | Stop reason: SDUPTHER

## 2020-12-22 ENCOUNTER — OFFICE VISIT (OUTPATIENT)
Dept: ORTHOPEDIC SURGERY | Facility: CLINIC | Age: 60
End: 2020-12-22

## 2020-12-22 VITALS — HEIGHT: 70 IN | HEART RATE: 78 BPM | OXYGEN SATURATION: 99 % | WEIGHT: 224 LBS | BODY MASS INDEX: 32.07 KG/M2

## 2020-12-22 DIAGNOSIS — M75.51 BURSITIS OF RIGHT SHOULDER: ICD-10-CM

## 2020-12-22 DIAGNOSIS — M75.41 IMPINGEMENT SYNDROME OF RIGHT SHOULDER: ICD-10-CM

## 2020-12-22 DIAGNOSIS — E10.618 DIABETIC FROZEN SHOULDER ASSOCIATED WITH TYPE 1 DIABETES MELLITUS (HCC): Primary | ICD-10-CM

## 2020-12-22 DIAGNOSIS — M75.00 DIABETIC FROZEN SHOULDER ASSOCIATED WITH TYPE 1 DIABETES MELLITUS (HCC): Primary | ICD-10-CM

## 2020-12-22 DIAGNOSIS — M24.511 CONTRACTURE OF JOINT OF RIGHT SHOULDER REGION: ICD-10-CM

## 2020-12-22 DIAGNOSIS — M75.111 NONTRAUMATIC INCOMPLETE TEAR OF RIGHT ROTATOR CUFF: ICD-10-CM

## 2020-12-22 PROCEDURE — 99213 OFFICE O/P EST LOW 20 MIN: CPT | Performed by: ORTHOPAEDIC SURGERY

## 2020-12-22 NOTE — PROGRESS NOTES
[facility] [place] [facility] Office Follow Up       Office Follow Up Visit       Patient Name: Alfredo Oconnor    Chief Complaint:   Chief Complaint   Patient presents with   • Right Shoulder - Follow-up     2 MONTH f/u last injection 10/20/20       Referring Physician: No ref. provider found    History of Present Illness:   It has been 2  month(s) since Alfredo Oconnor's last visit. Alfredo Oconnor returns to clinic today for F/U: follow-up of rightBody Part: shoulderReason: pain. The issue has been ongoing for 6 month(s). Alfredo Oconnor rates HIS/HER: hispain at 8/10 on the pain scale. Previous/current treatments: NSAIDS and physical therapy. Current symptoms:Symptoms: stiffness. The pain is worse with when moving and raising shoulder to the side; pain medication and/or NSAID improves the pain. Overall, he/she: heis doing better. I have reviewed the patient's history of present illness as noted/entered above.    I have reviewed the patient's past medical history, surgical history, social history, family history, medications, and allergies as noted in the electronic medical record and as noted/entered.  I have reviewed the patient's review of systems as noted/enter and updated as noted in the patient's HPI.      RIGHT SHOULDER  Right diabetic frozen shoulder  Last clinic visit 10/20/2020 steroid injection was performed at that time  He responded to steroid injection, anti-inflammatories, physical therapy  Improvements are noted.    Stiffness improving, but still having pain and weakness especially with abduction BUT no significant tearing on his MRI from 10/18/2020; re-reviewed notes from before and counseled on MRI    Still staying very active    Counseled scapular stabilization, impingement  Denies neck pain    Has been pleased but worsened last couple weeks  Overall pleased with progress      Subjective   Subjective      Review of Systems   Constitutional:  Negative.  Negative for chills, fatigue and fever.   HENT: Negative.  Negative for congestion and dental problem.    Eyes: Negative.  Negative for blurred vision.   Respiratory: Negative.  Negative for shortness of breath.    Cardiovascular: Negative.  Negative for leg swelling.   Gastrointestinal: Negative.  Negative for abdominal pain.   Endocrine: Negative.  Negative for polyuria.   Genitourinary: Negative.  Negative for difficulty urinating.   Musculoskeletal: Positive for arthralgias.   Skin: Negative.    Allergic/Immunologic: Negative.    Neurological: Negative.    Hematological: Negative.  Negative for adenopathy.   Psychiatric/Behavioral: Negative.  Negative for behavioral problems.        Past Medical History:   Past Medical History:   Diagnosis Date   • Diabetes (CMS/[facility])    • Diabetes mellitus type I (CMS/[facility])        Past Surgical History:   Past Surgical History:   Procedure Laterality Date   • CATARACT EXTRACTION     • VASECTOMY         Family History:   Family History   Problem Relation Age of Onset   • Hypertension Father        Social History:   Social History     Socioeconomic History   • Marital status:      Spouse name: Not on file   • Number of children: Not on file   • Years of education: Not on file   • Highest education level: Not on file   Tobacco Use   • Smoking status: Former Smoker     Start date:      Quit date:      Years since quittin.9   • Smokeless tobacco: Never Used   Substance and Sexual Activity   • Alcohol use: Yes     Comment: occasional   • Drug use: Never   • Sexual activity: Defer       Medications:   Current Outpatient Medications:   •  aspirin 81 MG chewable tablet, Chew 81 mg Daily., Disp: , Rfl:   •  celecoxib (CeleBREX) 200 MG capsule, , Disp: , Rfl:   •  famotidine (PEPCID) 20 MG tablet, Take 20 mg by mouth Daily., Disp: , Rfl:   •  glucose blood (ONE TOUCH ULTRA TEST) test strip, 1 strip 4 (Four) Times a Day., Disp: , Rfl:   •  ibuprofen  "(ADVIL,MOTRIN) 200 MG tablet, Take 200 mg by mouth Every 6 (Six) Hours As Needed for Mild Pain ., Disp: , Rfl:   •  levothyroxine (SYNTHROID, LEVOTHROID) 50 MCG tablet, , Disp: , Rfl:   •  lisinopril (PRINIVIL,ZESTRIL) 20 MG tablet, Take 20 mg by mouth Daily., Disp: , Rfl:   •  NovoLOG 100 UNIT/ML injection,  UNITS DAILY IN AN INSULIN PUMP, Disp: 30 mL, Rfl: 1  •  rosuvastatin (CRESTOR) 10 MG tablet, Take 1 tablet by mouth Daily., Disp: 90 tablet, Rfl: 1  •  VITAMIN D PO, Take  by mouth., Disp: , Rfl:     Allergies: No Known Allergies    The following portions of the patient's history were reviewed and updated as appropriate: allergies, current medications, past family history, past medical history, past social history, past surgical history and problem list.        Objective    Objective      Vital Signs:   Vitals:    12/22/20 0855   Pulse: 78   SpO2: 99%   Weight: 102 kg (224 lb)   Height: 177.8 cm (70\")       Ortho Exam:  Right shoulder pain persist especially with abduction  I think his primary issues are with scapular dyskinesis and scapular protraction  Some weakness of the rotator cuff due to poor scapular posture this improves with scapular stabilization  Range of motion active and passive has improved  Still mild diminished internal rotation  No skin changes  Well-perfused  SLT intact    Results Review:  Imaging Results (Last 24 Hours)     ** No results found for the last 24 hours. **          Procedures            Assessment / Plan      Assessment/Plan:   Problem List Items Addressed This Visit        Endocrine    Diabetic frozen shoulder associated with type 1 diabetes mellitus (CMS/[facility]) - Primary    Relevant Medications    NovoLOG 100 UNIT/ML injection       Musculoskeletal and Integument    Nontraumatic incomplete tear of right rotator cuff    Bursitis of right shoulder    Contracture of joint of right shoulder region       Other    Impingement syndrome of right shoulder          I personally " discussed the plan of care in detail with patient today. The patient verbally understands and agrees. I spent and counseled for approximately 15 minutes face to face, with greater than 50 % of the time counseling on the patient's diagnosis and plan discussed today.    Overall gains are made but still has quite a bit of debilitation with abduction significantly limited in this way I think is due to scapular posture.  Updated physical therapy prescription provided to see is been working with [facility] in [place].  I did provide a home program as well I would like him to keep me posted over the next 2 months we may proceed with a second injection if needed but discussed that surgery would typically be avoided.  We did discuss that it may be necessary ultimately but we will continue to follow him along.    Follow Up:   2 months as needed    Per Carrasco MD, FAAOS  Orthopedic Surgeon  Fellowship Trained Shoulder and Elbow Surgeon  [facility]  Orthopedics and Sports Medicine  [address] [place] [address], [address]  [place] [address]    12/22/20  09:10 EST    Please note that portions of this note may have been completed with a voice recognition program. Efforts were made to edit the dictations, but occasionally words are mistranscribed.

## 2021-01-05 ENCOUNTER — OFFICE VISIT (OUTPATIENT)
Dept: ORTHOPEDIC SURGERY | Facility: CLINIC | Age: 61
End: 2021-01-05

## 2021-01-05 VITALS — OXYGEN SATURATION: 99 % | HEART RATE: 81 BPM | WEIGHT: 224.87 LBS | BODY MASS INDEX: 32.19 KG/M2 | HEIGHT: 70 IN

## 2021-01-05 DIAGNOSIS — M65.351 TRIGGER LITTLE FINGER OF RIGHT HAND: ICD-10-CM

## 2021-01-05 DIAGNOSIS — M65.352 TRIGGER LITTLE FINGER OF LEFT HAND: Primary | ICD-10-CM

## 2021-01-05 DIAGNOSIS — E11.65 TYPE 2 DIABETES MELLITUS WITH HYPERGLYCEMIA, WITH LONG-TERM CURRENT USE OF INSULIN (HCC): ICD-10-CM

## 2021-01-05 DIAGNOSIS — Z79.4 TYPE 2 DIABETES MELLITUS WITH HYPERGLYCEMIA, WITH LONG-TERM CURRENT USE OF INSULIN (HCC): ICD-10-CM

## 2021-01-05 PROCEDURE — 99212 OFFICE O/P EST SF 10 MIN: CPT | Performed by: PHYSICIAN ASSISTANT

## 2021-01-05 NOTE — PROGRESS NOTES
"    [facility] Orthopa[facility] Surgery Clinic Note        Subjective     CC: Follow-up (2 month follow up - Trigger little finger of both hands )      RICHA Oconnor is a 60 y.o. male.  Returns for follow-up evaluation of bilateral A1 pulley small finger trigger digits.  He received corticosteroid injections 11/3/2020 which he reports were received all symptoms.  Patient has no further pain, locking or triggering.    Overall, patient's symptoms are improved.    ROS:    Constiutional:Pt denies fever, chills, nausea, or vomiting.  MSK:as above        Objective      Past Medical History  Past Medical History:   Diagnosis Date   • Diabetes (CMS/[facility])    • Diabetes mellitus type I (CMS/[facility])          Physical Exam  Pulse 81   Ht 177.8 cm (70\")   Wt 102 kg (224 lb 13.9 oz)   SpO2 99%   BMI 32.27 kg/m²     Body mass index is 32.27 kg/m².    Patient is well nourished and well developed.        Ortho Exam  Bilateral small fingers  No reproducible locking or triggering.    No palpable tenderness along the A1 pulley.      Imaging/Labs/EMG Reviewed:  No new imaging today.    Laboratory data  A1c performed on 11/12/2020--9.3      Assessment:  1. Trigger little finger of left hand    2. Trigger little finger of right hand    3. Type 2 diabetes mellitus with hyperglycemia, with long-term current use of insulin (CMS/[facility])        Plan:  1. Bilateral small finger A1 pulley trigger digits--100% resolution of all symptoms following corticosteroid injection.  2. Patient will continue with activity as tolerated.  3. Type 2 diabetes--needs to continue working with PCP to get blood sugars under better control.  If he ever requires release of the A1 pulleys he is under increased risk for infection wound healing, etc. secondary to his uncontrolled diabetes.  4. Follow-up as needed.  5. Questions and concerns answered.      Gracy Ivory PA-C  01/07/21  08:25 EST      Dragon disclaimer:  Much of this encounter note is an electronic " transcription/translation of spoken language to printed text. The electronic translation of spoken language may permit erroneous, or at times, nonsensical words or phrases to be inadvertently transcribed; Although I have reviewed the note for such errors, some may still exist.

## 2021-02-15 ENCOUNTER — LAB (OUTPATIENT)
Dept: LAB | Facility: HOSPITAL | Age: 61
End: 2021-02-15

## 2021-02-15 ENCOUNTER — OFFICE VISIT (OUTPATIENT)
Dept: ENDOCRINOLOGY | Facility: CLINIC | Age: 61
End: 2021-02-15

## 2021-02-15 VITALS
DIASTOLIC BLOOD PRESSURE: 74 MMHG | BODY MASS INDEX: 32.64 KG/M2 | SYSTOLIC BLOOD PRESSURE: 138 MMHG | HEART RATE: 72 BPM | HEIGHT: 70 IN | WEIGHT: 228 LBS

## 2021-02-15 DIAGNOSIS — E03.9 PRIMARY HYPOTHYROIDISM: ICD-10-CM

## 2021-02-15 DIAGNOSIS — E78.2 MIXED HYPERLIPIDEMIA: ICD-10-CM

## 2021-02-15 DIAGNOSIS — I10 BENIGN HYPERTENSION: ICD-10-CM

## 2021-02-15 DIAGNOSIS — E10.649 TYPE 1 DIABETES MELLITUS WITH HYPOGLYCEMIA AND WITHOUT COMA (HCC): ICD-10-CM

## 2021-02-15 DIAGNOSIS — Z96.41 INSULIN PUMP IN PLACE: ICD-10-CM

## 2021-02-15 DIAGNOSIS — E10.65 UNCONTROLLED TYPE 1 DIABETES MELLITUS WITH HYPERGLYCEMIA (HCC): Primary | ICD-10-CM

## 2021-02-15 LAB
EXPIRATION DATE: NORMAL
HBA1C MFR BLD: 8.2 %
Lab: NORMAL
TSH SERPL DL<=0.05 MIU/L-ACNC: 2.75 UIU/ML (ref 0.27–4.2)

## 2021-02-15 PROCEDURE — 83036 HEMOGLOBIN GLYCOSYLATED A1C: CPT | Performed by: INTERNAL MEDICINE

## 2021-02-15 PROCEDURE — 95251 CONT GLUC MNTR ANALYSIS I&R: CPT | Performed by: INTERNAL MEDICINE

## 2021-02-15 PROCEDURE — 84443 ASSAY THYROID STIM HORMONE: CPT

## 2021-02-15 PROCEDURE — 99214 OFFICE O/P EST MOD 30 MIN: CPT | Performed by: INTERNAL MEDICINE

## 2021-02-15 PROCEDURE — 36415 COLL VENOUS BLD VENIPUNCTURE: CPT

## 2021-02-15 RX ORDER — LOSARTAN POTASSIUM 100 MG/1
100 TABLET ORAL DAILY
Qty: 90 TABLET | Refills: 3 | Status: SHIPPED | OUTPATIENT
Start: 2021-02-15 | End: 2022-01-17

## 2021-02-15 NOTE — PROGRESS NOTES
"     Office Note      Date: 02/15/2021  Patient Name: Alfredo Oconnor  MRN: [id number]  : 1960    Chief Complaint   Patient presents with   • Diabetes       History of Present Illness:   Alfredo Oconnor is a 60 y.o. male who presents for Diabetes type 1. Diagnosed in: . Treated in past with insulin. Current treatments: [employer] insulin pump with DexCom G6 CGM with Control IQ. Number of insulin shots per day: none - on pump. Checks blood sugar none - on CGM. Has low blood sugar: occasional. Aspirin use: Yes. Statin use: Yes. ACE-I/ARB use: Yes. Changes in health since last visit: none. Last eye exam 2018.     He is taking T4 50mcg qd. He is taking this in the evening after a snack. He c/o fatigue. He notes dry skin. He hasn't noted any change in the size of his neck. He denies any compressive sxs.    Subjective      Diabetic Complications:  Eyes: No  Kidneys: No  Feet: No  Heart: No    Diet and Exercise:  Meals per day: 3  Minutes of exercise per week: 0 mins.    Review of Systems:   Review of Systems   Constitutional: Positive for fatigue.   Respiratory: Positive for shortness of breath.    Cardiovascular: Negative.    Gastrointestinal: Negative.    Endocrine: Negative.        The following portions of the patient's history were reviewed and updated as appropriate: allergies, current medications, past family history, past medical history, past social history, past surgical history and problem list.    Objective       Visit Vitals  /74 (BP Location: Right arm, Patient Position: Sitting, Cuff Size: Adult)   Pulse 72   Ht 177.8 cm (70\")   Wt 103 kg (228 lb)   BMI 32.71 kg/m²       Physical Exam:  Physical Exam  Constitutional:       Appearance: Normal appearance.   Neurological:      Mental Status: He is alert.         Labs:    HbA1c  Lab Results   Component Value Date    HGBA1C 8.2 02/15/2021       CMP  No results found for: GLUCOSE, BUN, CREATININE, EGFRIFNONA, EGFRIFAFRI, BCR, K, CO2, CALCIUM, PROTENTOTREF, " LABIL2, BILIRUBIN, AST, ALT     Lipid Panel        TSH  No results found for: TSH, FREET4     Hemoglobin A1C  Lab Results   Component Value Date    HGBA1C 8.2 02/15/2021        Microalbumin/Creatinine  No results found for: MALBCRERATIO, CREATINIURIN, MICROALBUR        Assessment / Plan      Assessment & Plan:  Diagnoses and all orders for this visit:    1. Uncontrolled type 1 diabetes mellitus with hyperglycemia (CMS/HCC) (Primary)  Assessment & Plan:  Diabetes is improving with treatment.   Continue current treatment regimen.  Diabetes will be reassessed in 3 months.    A1c significantly better after only a month of Control IQ.  Still having some mild postprandial spikes.   Will adjust CHO ratio.      2. Type 1 diabetes mellitus with hypoglycemia and without coma (CMS/[facility])  Assessment & Plan:  Continue CGM.    Orders:  -     POC Glycosylated Hemoglobin (Hb A1C)    3. Primary hypothyroidism  Assessment & Plan:  Continue T4.  Check TSH today.    Orders:  -     TSH; Future    4. Insulin pump in place    5. Benign hypertension  Assessment & Plan:  Hypertension is unchanged.  His wife says he has a dry cough and wonders if this is due to lisinopril.  Will try change to losartan.  Blood pressure will be reassessed in 3 months.      6. Mixed hyperlipidemia  Assessment & Plan:  Continue statin.      Other orders  -     losartan (COZAAR) 100 MG tablet; Take 1 tablet by mouth Daily.  Dispense: 90 tablet; Refill: 3      Return in about 3 months (around 5/15/2021) for Recheck with A1c.    Zhao Yung MD   02/15/2021

## 2021-02-15 NOTE — ASSESSMENT & PLAN NOTE
Diabetes is improving with treatment.   Continue current treatment regimen.  Diabetes will be reassessed in 3 months.    A1c significantly better after only a month of Control IQ.  Still having some mild postprandial spikes.   Will adjust CHO ratio.

## 2021-02-15 NOTE — ASSESSMENT & PLAN NOTE
Hypertension is unchanged.  His wife says he has a dry cough and wonders if this is due to lisinopril.  Will try change to losartan.  Blood pressure will be reassessed in 3 months.

## 2021-05-26 ENCOUNTER — OFFICE VISIT (OUTPATIENT)
Dept: ENDOCRINOLOGY | Facility: CLINIC | Age: 61
End: 2021-05-26

## 2021-05-26 VITALS
SYSTOLIC BLOOD PRESSURE: 146 MMHG | HEART RATE: 75 BPM | HEIGHT: 70 IN | WEIGHT: 226 LBS | OXYGEN SATURATION: 99 % | DIASTOLIC BLOOD PRESSURE: 78 MMHG | BODY MASS INDEX: 32.35 KG/M2

## 2021-05-26 DIAGNOSIS — E03.9 PRIMARY HYPOTHYROIDISM: ICD-10-CM

## 2021-05-26 DIAGNOSIS — E78.2 MIXED HYPERLIPIDEMIA: ICD-10-CM

## 2021-05-26 DIAGNOSIS — E10.649 TYPE 1 DIABETES MELLITUS WITH HYPOGLYCEMIA AND WITHOUT COMA (HCC): ICD-10-CM

## 2021-05-26 DIAGNOSIS — E10.65 UNCONTROLLED TYPE 1 DIABETES MELLITUS WITH HYPERGLYCEMIA (HCC): Primary | ICD-10-CM

## 2021-05-26 DIAGNOSIS — I10 BENIGN HYPERTENSION: ICD-10-CM

## 2021-05-26 DIAGNOSIS — Z96.41 INSULIN PUMP IN PLACE: ICD-10-CM

## 2021-05-26 LAB
EXPIRATION DATE: NORMAL
EXPIRATION DATE: NORMAL
GLUCOSE BLDC GLUCOMTR-MCNC: 93 MG/DL (ref 70–130)
HBA1C MFR BLD: 7.7 %
Lab: NORMAL
Lab: NORMAL

## 2021-05-26 PROCEDURE — 95251 CONT GLUC MNTR ANALYSIS I&R: CPT | Performed by: INTERNAL MEDICINE

## 2021-05-26 PROCEDURE — 83036 HEMOGLOBIN GLYCOSYLATED A1C: CPT | Performed by: INTERNAL MEDICINE

## 2021-05-26 PROCEDURE — 99214 OFFICE O/P EST MOD 30 MIN: CPT | Performed by: INTERNAL MEDICINE

## 2021-05-26 RX ORDER — INSULIN PUMP CARTRIDGE
CARTRIDGE (EA) SUBCUTANEOUS
COMMUNITY

## 2021-05-26 RX ORDER — SUBCUTANEOUS INSULIN PUMP
EACH MISCELLANEOUS
COMMUNITY

## 2021-05-26 NOTE — ASSESSMENT & PLAN NOTE
Hypertension is unchanged.  Continue current treatment regimen. Monitor BP at home.  Blood pressure will be reassessed at the next regular appointment.

## 2021-05-26 NOTE — PROGRESS NOTES
"     Office Note      Date: 2021  Patient Name: Alfredo Oconnor  MRN: [id number]  : 1960    Chief Complaint   Patient presents with   • Diabetes       History of Present Illness:   Alfredo Oconnor is a 61 y.o. male who presents for Diabetes type 1. Diagnosed in: . Treated in past with insulin. Current treatments: Tandem insulin pump with DexCom G6 CGM with Control IQ. Number of insulin shots per day: none - on pump. Checks blood sugar 288x per day - on CGM. Has low blood sugar: occasional. Aspirin use: Yes. Statin use: Yes. ACE-I/ARB use: Yes. Changes in health since last visit: none. Last eye exam 2018.     He is taking T4 50mcg qd. He is taking this in the evening after a snack. He c/o fatigue. He notes dry skin. He hasn't noted any change in the size of his neck. He denies any compressive sxs.    Subjective      Diabetic Complications:  Eyes: No  Kidneys: No  Feet: No  Heart: No    Diet and Exercise:  Meals per day: 3  Minutes of exercise per week: 0 mins.    Review of Systems:   Review of Systems   Constitutional: Positive for fatigue.   Cardiovascular: Negative.    Gastrointestinal: Negative.    Endocrine: Negative.        The following portions of the patient's history were reviewed and updated as appropriate: allergies, current medications, past family history, past medical history, past social history, past surgical history and problem list.    Objective       Visit Vitals  /78 (BP Location: Left arm, Patient Position: Sitting, Cuff Size: Adult)   Pulse 75   Ht 177.8 cm (70\")   Wt 103 kg (226 lb)   SpO2 99%   BMI 32.43 kg/m²       Physical Exam:  Physical Exam  Constitutional:       Appearance: Normal appearance.   Neurological:      Mental Status: He is alert.         Labs:    HbA1c  Lab Results   Component Value Date    HGBA1C 7.7 2021       CMP  No results found for: GLUCOSE, BUN, CREATININE, EGFRIFNONA, EGFRIFAFRI, BCR, K, CO2, CALCIUM, PROTENTOTREF, LABIL2, BILIRUBIN, AST, ALT "     Lipid Panel        TSH  Lab Results   Component Value Date    TSH 2.750 02/15/2021        Hemoglobin A1C  Lab Results   Component Value Date    HGBA1C 7.7 05/26/2021        Microalbumin/Creatinine  No results found for: MALBCRERATIO, CREATINIURIN, MICROALBUR        Assessment / Plan      Assessment & Plan:  Diagnoses and all orders for this visit:    1. Uncontrolled type 1 diabetes mellitus with hyperglycemia (CMS/formerly [facility]) (Primary)  Assessment & Plan:  Diabetes is improving with treatment.   Continue current treatment regimen.  Diabetes will be reassessed in 3 months.    CGM shows postprandial rise with lunch.  Adjust CHO ratio.  Some lows upon correction of highs.  Will adjust ISF.    Orders:  -     POC Glycosylated Hemoglobin (Hb A1C)  -     POC Glucose, Blood    2. Type 1 diabetes mellitus with hypoglycemia and without coma (CMS/formerly [facility])  Assessment & Plan:  Continue DexCom.      3. Benign hypertension  Assessment & Plan:  Hypertension is unchanged.  Continue current treatment regimen. Monitor BP at home.  Blood pressure will be reassessed at the next regular appointment.      4. Mixed hyperlipidemia  Assessment & Plan:  Continue statin.  Plan to check lipids next visit.      5. Primary hypothyroidism  Assessment & Plan:  TSH okay last visit.  Continue T4 tx.  Check TSH next visit.      6. Insulin pump in place      Return in about 3 months (around 8/26/2021) for Recheck with A1c, CMP, lipids, TSH, microalbumin.    Zhao Yung MD   05/26/2021

## 2021-05-26 NOTE — ASSESSMENT & PLAN NOTE
Diabetes is improving with treatment.   Continue current treatment regimen.  Diabetes will be reassessed in 3 months.    CGM shows postprandial rise with lunch.  Adjust CHO ratio.  Some lows upon correction of highs.  Will adjust ISF.

## 2021-06-11 NOTE — TELEPHONE ENCOUNTER
[facility] CALLED IN REGARDS TO THIS PT'S NOVOLOG RX. THE PHARM STATED THAT THEY REQUIRE A DIAGNOSIS CODE TO FILL THE RX FOR MEDICARE PURPOSES. PLEASE RESEND W/ CODE. THANK YOU

## 2021-09-03 ENCOUNTER — OFFICE VISIT (OUTPATIENT)
Dept: ENDOCRINOLOGY | Facility: CLINIC | Age: 61
End: 2021-09-03

## 2021-09-03 VITALS
DIASTOLIC BLOOD PRESSURE: 82 MMHG | HEIGHT: 70 IN | WEIGHT: 227.2 LBS | OXYGEN SATURATION: 94 % | HEART RATE: 74 BPM | BODY MASS INDEX: 32.53 KG/M2 | SYSTOLIC BLOOD PRESSURE: 126 MMHG

## 2021-09-03 DIAGNOSIS — Z96.41 INSULIN PUMP IN PLACE: ICD-10-CM

## 2021-09-03 DIAGNOSIS — E10.65 TYPE 1 DIABETES MELLITUS WITH HYPERGLYCEMIA (HCC): Primary | Chronic | ICD-10-CM

## 2021-09-03 LAB
EXPIRATION DATE: ABNORMAL
EXPIRATION DATE: NORMAL
GLUCOSE BLDC GLUCOMTR-MCNC: 212 MG/DL (ref 70–130)
HBA1C MFR BLD: 7.7 %
Lab: ABNORMAL
Lab: NORMAL

## 2021-09-03 PROCEDURE — 3044F HG A1C LEVEL LT 7.0%: CPT | Performed by: PHYSICIAN ASSISTANT

## 2021-09-03 PROCEDURE — 99213 OFFICE O/P EST LOW 20 MIN: CPT | Performed by: PHYSICIAN ASSISTANT

## 2021-09-03 PROCEDURE — 95251 CONT GLUC MNTR ANALYSIS I&R: CPT | Performed by: PHYSICIAN ASSISTANT

## 2021-09-03 PROCEDURE — 83036 HEMOGLOBIN GLYCOSYLATED A1C: CPT | Performed by: PHYSICIAN ASSISTANT

## 2021-09-03 RX ORDER — LEVOTHYROXINE SODIUM 0.05 MG/1
TABLET ORAL
Qty: 90 TABLET | Refills: 1 | Status: SHIPPED | OUTPATIENT
Start: 2021-09-03 | End: 2022-04-25

## 2021-09-03 NOTE — PROGRESS NOTES
"     Office Note      Date: 2021  Patient Name: Alfredo Oconnor  MRN: [id number]  : 1960    Chief Complaint   Patient presents with   • Diabetes       History of Present Illness:   Alfredo Oconnor is a 61 y.o. male who presents today for follow up on type 1 diabetes, diagnosed .  Known diabetic complications: none.  He remains on Tandem X2 insulin pump and using Dexcom G6 CGM.  He is using the Control-IQ program.  He reports occasional hypoglycemia.  He denies any severe hypoglycemia.  Feet: No sores.   Eye exam current (within one year): yes, 2021.  ACE inhibitor/ARB: Yes, losartan.  Statin: Yes, rosuvastatin.    Subjective      Review of Systems:   Review of Systems   Constitutional: Negative.    Cardiovascular: Negative.    Gastrointestinal: Negative.    Endocrine: Negative.        The following portions of the patient's history were reviewed and updated as appropriate: allergies, current medications, past family history, past medical history, past social history, past surgical history and problem list.    Objective     Vitals:    21 0922   BP: 126/82   Pulse: 74   SpO2: 94%   Weight: 103 kg (227 lb 3.2 oz)   Height: 177.8 cm (70\")   PainSc: 0-No pain     Body mass index is 32.6 kg/m².    Physical Exam  Vitals reviewed.   Constitutional:       General: He is not in acute distress.  Neurological:      Mental Status: He is alert and oriented to person, place, and time.   Psychiatric:         Mood and Affect: Affect normal.         HEMOGLOBIN A1C  Lab Results   Component Value Date    HGBA1C 7.7 2021    HGBA1C 7.7 2021    HGBA1C 8.2 02/15/2021     GLUCOSE  Lab Results   Component Value Date    POCGLU 212 (A) 2021     THYROID  Lab Results   Component Value Date    TSH 2.750 02/15/2021       Current Outpatient Medications   Medication Instructions   • aspirin 81 mg, Oral, Daily   • celecoxib (CeleBREX) 200 MG capsule Daily   • famotidine (PEPCID) 20 mg, Oral, Daily   • glucose " blood (ONE TOUCH ULTRA TEST) test strip 1 strip, 4 Times Daily, As needed - on cgm   • ibuprofen (ADVIL,MOTRIN) 200 mg, Oral, Every 6 Hours PRN   • Insulin Infusion Pump (T:slim X2 Insulin Pump) device Does not apply, Use as directed with Humalog insulin    • Insulin Infusion Pump Supplies (T:slim X2 3mL Cartridge) misc Does not apply, Use as directed in Tslim pump    • levothyroxine (SYNTHROID, LEVOTHROID) 50 MCG tablet Take 1 tablet daily   • losartan (COZAAR) 100 mg, Oral, Daily   • NovoLOG 100 UNIT/ML injection  UNITS DAILY IN AN INSULIN PUMP Dx E10.65   • rosuvastatin (CRESTOR) 10 mg, Oral, Daily   • VITAMIN D PO Oral, Daily       Assessment / Plan      Assessment & Plan:  1. Type 1 diabetes mellitus with hyperglycemia (CMS/[facility])  A1c above goal.  Reviewed pump/CGM data and discussed with patient.  Sensor glucose average 165 for the past 2 weeks, 60% time in range, 1% low.  Hypoglycemia after lunch/dinner if entering all carbs.  Decrease basal from 4 PM through 12 AM to 1.7 units/h.  Adjust correction factor from 7:30 AM through 12 AM to 45.  Adjust carb ratio at 12 PM and 4 PM from 4.5 to 5.5.  BP okay.  - POC Glycosylated Hemoglobin (Hb A1C)  - POC Glucose, Blood    2. Insulin pump in place    Advised to call with any questions or concerns before next visit.  Return in about 3 months (around 12/3/2021) for fasting follow up with A1c, lipids, CMP, microalbumin, TSH.     KRISTEN Sanford  Endocrinology  09/03/2021

## 2021-12-17 ENCOUNTER — OFFICE VISIT (OUTPATIENT)
Dept: ENDOCRINOLOGY | Facility: CLINIC | Age: 61
End: 2021-12-17

## 2021-12-17 ENCOUNTER — LAB (OUTPATIENT)
Dept: LAB | Facility: HOSPITAL | Age: 61
End: 2021-12-17

## 2021-12-17 VITALS
BODY MASS INDEX: 32.93 KG/M2 | HEIGHT: 70 IN | WEIGHT: 230 LBS | HEART RATE: 69 BPM | SYSTOLIC BLOOD PRESSURE: 122 MMHG | DIASTOLIC BLOOD PRESSURE: 70 MMHG | OXYGEN SATURATION: 98 %

## 2021-12-17 DIAGNOSIS — Z96.41 INSULIN PUMP IN PLACE: ICD-10-CM

## 2021-12-17 DIAGNOSIS — E55.9 VITAMIN D DEFICIENCY: ICD-10-CM

## 2021-12-17 DIAGNOSIS — I10 BENIGN HYPERTENSION: Chronic | ICD-10-CM

## 2021-12-17 DIAGNOSIS — E78.2 MIXED HYPERLIPIDEMIA: Chronic | ICD-10-CM

## 2021-12-17 DIAGNOSIS — E03.9 PRIMARY HYPOTHYROIDISM: Chronic | ICD-10-CM

## 2021-12-17 DIAGNOSIS — E10.65 TYPE 1 DIABETES MELLITUS WITH HYPERGLYCEMIA: Primary | Chronic | ICD-10-CM

## 2021-12-17 DIAGNOSIS — E10.65 TYPE 1 DIABETES MELLITUS WITH HYPERGLYCEMIA (HCC): Chronic | ICD-10-CM

## 2021-12-17 LAB
25(OH)D3 SERPL-MCNC: 62 NG/ML
ALBUMIN SERPL-MCNC: 4.2 G/DL (ref 3.5–5.2)
ALBUMIN UR-MCNC: <1.2 MG/DL
ALBUMIN/GLOB SERPL: 1.3 G/DL
ALP SERPL-CCNC: 92 U/L (ref 39–117)
ALT SERPL W P-5'-P-CCNC: 13 U/L (ref 1–41)
ANION GAP SERPL CALCULATED.3IONS-SCNC: 7 MMOL/L (ref 5–15)
AST SERPL-CCNC: 15 U/L (ref 1–40)
BILIRUB SERPL-MCNC: 0.4 MG/DL (ref 0–1.2)
BUN SERPL-MCNC: 18 MG/DL (ref 8–23)
BUN/CREAT SERPL: 20.2 (ref 7–25)
CALCIUM SPEC-SCNC: 9.7 MG/DL (ref 8.6–10.5)
CHLORIDE SERPL-SCNC: 102 MMOL/L (ref 98–107)
CHOLEST SERPL-MCNC: 130 MG/DL (ref 0–200)
CO2 SERPL-SCNC: 29 MMOL/L (ref 22–29)
CREAT SERPL-MCNC: 0.89 MG/DL (ref 0.76–1.27)
CREAT UR-MCNC: 73.3 MG/DL
EXPIRATION DATE: ABNORMAL
EXPIRATION DATE: NORMAL
GFR SERPL CREATININE-BSD FRML MDRD: 87 ML/MIN/1.73
GLOBULIN UR ELPH-MCNC: 3.2 GM/DL
GLUCOSE BLDC GLUCOMTR-MCNC: 146 MG/DL (ref 70–130)
GLUCOSE SERPL-MCNC: 168 MG/DL (ref 65–99)
HBA1C MFR BLD: 8.6 %
HDLC SERPL-MCNC: 38 MG/DL (ref 40–60)
LDLC SERPL CALC-MCNC: 71 MG/DL (ref 0–100)
LDLC/HDLC SERPL: 1.83 {RATIO}
Lab: ABNORMAL
Lab: NORMAL
MICROALBUMIN/CREAT UR: NORMAL MG/G{CREAT}
POTASSIUM SERPL-SCNC: 4.5 MMOL/L (ref 3.5–5.2)
PROT SERPL-MCNC: 7.4 G/DL (ref 6–8.5)
SODIUM SERPL-SCNC: 138 MMOL/L (ref 136–145)
TRIGL SERPL-MCNC: 113 MG/DL (ref 0–150)
TSH SERPL DL<=0.05 MIU/L-ACNC: 3.02 UIU/ML (ref 0.27–4.2)
VLDLC SERPL-MCNC: 21 MG/DL (ref 5–40)

## 2021-12-17 PROCEDURE — 80053 COMPREHEN METABOLIC PANEL: CPT

## 2021-12-17 PROCEDURE — 82306 VITAMIN D 25 HYDROXY: CPT

## 2021-12-17 PROCEDURE — 82570 ASSAY OF URINE CREATININE: CPT

## 2021-12-17 PROCEDURE — 80061 LIPID PANEL: CPT

## 2021-12-17 PROCEDURE — 95251 CONT GLUC MNTR ANALYSIS I&R: CPT | Performed by: PHYSICIAN ASSISTANT

## 2021-12-17 PROCEDURE — 84443 ASSAY THYROID STIM HORMONE: CPT

## 2021-12-17 PROCEDURE — 3052F HG A1C>EQUAL 8.0%<EQUAL 9.0%: CPT | Performed by: PHYSICIAN ASSISTANT

## 2021-12-17 PROCEDURE — 83036 HEMOGLOBIN GLYCOSYLATED A1C: CPT | Performed by: PHYSICIAN ASSISTANT

## 2021-12-17 PROCEDURE — 99214 OFFICE O/P EST MOD 30 MIN: CPT | Performed by: PHYSICIAN ASSISTANT

## 2021-12-17 PROCEDURE — 82043 UR ALBUMIN QUANTITATIVE: CPT

## 2021-12-17 RX ORDER — HYDROCHLOROTHIAZIDE 25 MG/1
TABLET ORAL DAILY
COMMUNITY
Start: 2021-11-26

## 2021-12-17 RX ORDER — TAMSULOSIN HYDROCHLORIDE 0.4 MG/1
CAPSULE ORAL
COMMUNITY
Start: 2021-11-26

## 2021-12-17 RX ORDER — ROSUVASTATIN CALCIUM 10 MG/1
10 TABLET, COATED ORAL DAILY
Qty: 90 TABLET | Refills: 3 | Status: SHIPPED | OUTPATIENT
Start: 2021-12-17 | End: 2022-12-13 | Stop reason: SDUPTHER

## 2022-01-17 RX ORDER — LOSARTAN POTASSIUM 100 MG/1
TABLET ORAL
Qty: 30 TABLET | Refills: 0 | Status: SHIPPED | OUTPATIENT
Start: 2022-01-17 | End: 2022-03-28

## 2022-03-23 ENCOUNTER — TRANSCRIBE ORDERS (OUTPATIENT)
Dept: CARDIOLOGY | Facility: CLINIC | Age: 62
End: 2022-03-23

## 2022-03-23 DIAGNOSIS — R06.02 SHORTNESS OF BREATH: Primary | ICD-10-CM

## 2022-03-28 ENCOUNTER — OFFICE VISIT (OUTPATIENT)
Dept: ENDOCRINOLOGY | Facility: CLINIC | Age: 62
End: 2022-03-28

## 2022-03-28 VITALS
BODY MASS INDEX: 32.64 KG/M2 | RESPIRATION RATE: 14 BRPM | HEART RATE: 73 BPM | DIASTOLIC BLOOD PRESSURE: 68 MMHG | SYSTOLIC BLOOD PRESSURE: 142 MMHG | OXYGEN SATURATION: 98 % | WEIGHT: 228 LBS | HEIGHT: 70 IN

## 2022-03-28 DIAGNOSIS — I10 BENIGN HYPERTENSION: Chronic | ICD-10-CM

## 2022-03-28 DIAGNOSIS — E78.2 MIXED HYPERLIPIDEMIA: Chronic | ICD-10-CM

## 2022-03-28 DIAGNOSIS — E10.65 TYPE 1 DIABETES MELLITUS WITH HYPERGLYCEMIA: Primary | Chronic | ICD-10-CM

## 2022-03-28 DIAGNOSIS — Z96.41 PRESENCE OF INSULIN PUMP: ICD-10-CM

## 2022-03-28 DIAGNOSIS — E03.9 PRIMARY HYPOTHYROIDISM: Chronic | ICD-10-CM

## 2022-03-28 DIAGNOSIS — E10.649 TYPE 1 DIABETES MELLITUS WITH HYPOGLYCEMIA AND WITHOUT COMA: ICD-10-CM

## 2022-03-28 LAB
EXPIRATION DATE: ABNORMAL
EXPIRATION DATE: NORMAL
GLUCOSE BLDC GLUCOMTR-MCNC: 202 MG/DL (ref 70–130)
HBA1C MFR BLD: 7.6 %
Lab: ABNORMAL
Lab: NORMAL

## 2022-03-28 PROCEDURE — 3051F HG A1C>EQUAL 7.0%<8.0%: CPT | Performed by: PHYSICIAN ASSISTANT

## 2022-03-28 PROCEDURE — 99214 OFFICE O/P EST MOD 30 MIN: CPT | Performed by: PHYSICIAN ASSISTANT

## 2022-03-28 PROCEDURE — 95251 CONT GLUC MNTR ANALYSIS I&R: CPT | Performed by: PHYSICIAN ASSISTANT

## 2022-03-28 PROCEDURE — 83036 HEMOGLOBIN GLYCOSYLATED A1C: CPT | Performed by: PHYSICIAN ASSISTANT

## 2022-03-28 RX ORDER — SPIRONOLACTONE 50 MG/1
25 TABLET, FILM COATED ORAL DAILY
COMMUNITY

## 2022-04-25 RX ORDER — LEVOTHYROXINE SODIUM 50 UG/1
TABLET ORAL
Qty: 30 TABLET | Refills: 5 | Status: SHIPPED | OUTPATIENT
Start: 2022-04-25 | End: 2022-10-17

## 2022-06-28 DIAGNOSIS — Z01.812 BLOOD TESTS PRIOR TO TREATMENT OR PROCEDURE: Primary | ICD-10-CM

## 2022-07-05 ENCOUNTER — OFFICE VISIT (OUTPATIENT)
Dept: ENDOCRINOLOGY | Facility: CLINIC | Age: 62
End: 2022-07-05

## 2022-07-05 ENCOUNTER — CLINICAL SUPPORT NO REQUIREMENTS (OUTPATIENT)
Dept: PULMONOLOGY | Facility: CLINIC | Age: 62
End: 2022-07-05

## 2022-07-05 VITALS
DIASTOLIC BLOOD PRESSURE: 62 MMHG | OXYGEN SATURATION: 96 % | BODY MASS INDEX: 32.21 KG/M2 | SYSTOLIC BLOOD PRESSURE: 104 MMHG | WEIGHT: 225 LBS | HEART RATE: 78 BPM | HEIGHT: 70 IN

## 2022-07-05 DIAGNOSIS — Z96.41 PRESENCE OF INSULIN PUMP: ICD-10-CM

## 2022-07-05 DIAGNOSIS — E10.65 TYPE 1 DIABETES MELLITUS WITH HYPERGLYCEMIA: Primary | Chronic | ICD-10-CM

## 2022-07-05 DIAGNOSIS — E03.9 PRIMARY HYPOTHYROIDISM: Chronic | ICD-10-CM

## 2022-07-05 DIAGNOSIS — I10 BENIGN HYPERTENSION: Chronic | ICD-10-CM

## 2022-07-05 DIAGNOSIS — Z01.812 BLOOD TESTS PRIOR TO TREATMENT OR PROCEDURE: ICD-10-CM

## 2022-07-05 DIAGNOSIS — E78.2 MIXED HYPERLIPIDEMIA: Chronic | ICD-10-CM

## 2022-07-05 LAB
EXPIRATION DATE: ABNORMAL
EXPIRATION DATE: NORMAL
GLUCOSE BLDC GLUCOMTR-MCNC: 196 MG/DL (ref 70–130)
HBA1C MFR BLD: 8.1 %
Lab: ABNORMAL
Lab: NORMAL

## 2022-07-05 PROCEDURE — 99211 OFF/OP EST MAY X REQ PHY/QHP: CPT | Performed by: INTERNAL MEDICINE

## 2022-07-05 PROCEDURE — 3052F HG A1C>EQUAL 8.0%<EQUAL 9.0%: CPT | Performed by: PHYSICIAN ASSISTANT

## 2022-07-05 PROCEDURE — U0005 INFEC AGEN DETEC AMPLI PROBE: HCPCS | Performed by: INTERNAL MEDICINE

## 2022-07-05 PROCEDURE — 99214 OFFICE O/P EST MOD 30 MIN: CPT | Performed by: PHYSICIAN ASSISTANT

## 2022-07-05 PROCEDURE — 95251 CONT GLUC MNTR ANALYSIS I&R: CPT | Performed by: PHYSICIAN ASSISTANT

## 2022-07-05 PROCEDURE — U0004 COV-19 TEST NON-CDC HGH THRU: HCPCS | Performed by: INTERNAL MEDICINE

## 2022-07-05 PROCEDURE — 83036 HEMOGLOBIN GLYCOSYLATED A1C: CPT | Performed by: PHYSICIAN ASSISTANT

## 2022-07-05 RX ORDER — INSULIN ASPART 100 [IU]/ML
INJECTION, SOLUTION INTRAVENOUS; SUBCUTANEOUS
Qty: 30 ML | Refills: 11 | Status: SHIPPED | OUTPATIENT
Start: 2022-07-05

## 2022-07-05 RX ORDER — PROCHLORPERAZINE 25 MG/1
SUPPOSITORY RECTAL
COMMUNITY

## 2022-07-05 RX ORDER — OLMESARTAN MEDOXOMIL 20 MG/1
20 TABLET ORAL DAILY
COMMUNITY
Start: 2022-05-10

## 2022-07-05 NOTE — PROGRESS NOTES
Office Note      Date: 2022  Patient Name: Alfredo Oconnor  MRN: [id number]  : 1960    Chief Complaint   Patient presents with   • Diabetes       History of Present Illness:   Alfredo Oconnor is a 62 y.o. male who presents today for follow up on type 1 diabetes.  Diabetes was diagnosed in .  Known diabetic complications: Mild retinopathy.  He remains on Tandem X2 insulin pump.  He is using the Dexcom G6 CGM and Control-IQ program.  No issues with pump.  He feels like blood sugars have been higher.  He is adding carbohydrates over actual carb count to increase bolus with meals.  He reports some hypoglycemia.  He denies any severe hypoglycemia.  Feet: No sores.   Eye exam current (within one year): yes, 2021.  Mild NPDR, DME OD - stable.  ACE inhibitor/ARB: Yes, olmesartan.  Statin: Yes, rosuvastatin.  He is undergoing evaluation for shortness of breath on exertion.  Cardiac workup was okay.  He is scheduled to see pulmonology.  He reports lightheadedness after first standing.  He decreased spironolactone by 1/2 about a week ago.    Subjective      Review of Systems:   Review of Systems   Constitutional: Negative.    Respiratory: Positive for shortness of breath.    Cardiovascular: Negative.    Gastrointestinal: Negative.    Endocrine: Negative.    Neurological: Positive for light-headedness.       Past Medical History:   Diagnosis Date   • Hypertensive disorder    • Hypoglycemia due to type 1 diabetes mellitus (HCC)    • Hypothyroidism    • Insulin pump in place    • Mixed hyperlipidemia    • Obesity    • Type 1 diabetes mellitus, uncontrolled    • Vitamin D deficiency       Past Surgical History:   Procedure Laterality Date   • CATARACT EXTRACTION     • VASECTOMY         The following portions of the patient's history were reviewed and updated as appropriate: allergies, current medications, past family history, past medical history, past social history, past surgical history and problem  "list.    Objective     Vitals:    07/05/22 1019   BP: 104/62   Pulse: 78   SpO2: 96%   Weight: 102 kg (225 lb)   Height: 177.8 cm (70\")   PainSc: 0-No pain   Body mass index is 32.28 kg/m².    Physical Exam  Vitals reviewed.   Constitutional:       General: He is not in acute distress.  Cardiovascular:      Pulses:           Dorsalis pedis pulses are 2+ on the right side and 2+ on the left side.        Posterior tibial pulses are 2+ on the right side and 2+ on the left side.   Musculoskeletal:      Right foot: No deformity.      Left foot: No deformity.   Feet:      Right foot:      Protective Sensation: 8 sites tested. 8 sites sensed.      Skin integrity: Callus (mild) present.      Toenail Condition: Right toenails are normal.      Left foot:      Protective Sensation: 8 sites tested. 8 sites sensed.      Skin integrity: Callus (mild) present.      Toenail Condition: Left toenails are normal.      Comments: Diabetic Foot Exam Performed and Monofilament Test Performed  Neurological:      Mental Status: He is alert and oriented to person, place, and time.   Psychiatric:         Mood and Affect: Affect normal.         HEMOGLOBIN A1C  Lab Results   Component Value Date    HGBA1C 8.1 07/05/2022    HGBA1C 7.6 03/28/2022    HGBA1C 8.6 12/17/2021     GLUCOSE  Lab Results   Component Value Date    POCGLU 196 (A) 07/05/2022     CMP  Lab Results   Component Value Date    GLUCOSE 168 (H) 12/17/2021    BUN 18 12/17/2021    CREATININE 0.89 12/17/2021    EGFRIFNONA 87 12/17/2021    BCR 20.2 12/17/2021     12/17/2021    K 4.5 12/17/2021    CO2 29.0 12/17/2021    CALCIUM 9.7 12/17/2021    ALBUMIN 4.20 12/17/2021    BILITOT 0.4 12/17/2021    ALKPHOS 92 12/17/2021    AST 15 12/17/2021    ALT 13 12/17/2021     LIPID PANEL  Lab Results   Component Value Date    CHOL 130 12/17/2021    TRIG 113 12/17/2021    HDL 38 (L) 12/17/2021    LDL 71 12/17/2021     URINE MICROALBUMIN/CREATININE RATIO  Lab Results   Component Value Date    " ROBSON  12/17/2021      Comment:      Unable to calculate     THYROID  Lab Results   Component Value Date    TSH 3.020 12/17/2021       Current Outpatient Medications   Medication Instructions   • ACETAMINOPHEN PO 1,000 mg, Oral, Daily PRN   • aspirin 81 mg, Oral, Daily   • celecoxib (CeleBREX) 200 MG capsule Daily   • Continuous Blood Gluc Sensor (Dexcom G6 Sensor) Does not apply, Every 10 Days   • Euthyrox 50 MCG tablet Take 1 tablet by mouth once daily   • glucose blood test strip 1 strip, 4 Times Daily, As needed - on cgm   • hydroCHLOROthiazide (HYDRODIURIL) 25 MG tablet Daily   • Insulin Aspart (NovoLOG) 100 UNIT/ML injection  UNITS DAILY IN INSULIN PUMP   • Insulin Infusion Pump (T:slim X2 Insulin Pump) device Does not apply, Use as directed with Humalog insulin    • Insulin Infusion Pump Supplies (T:slim X2 3mL Cartridge) misc Does not apply, Use as directed in Tslim pump    • olmesartan (BENICAR) 20 mg, Oral, Daily   • rosuvastatin (CRESTOR) 10 mg, Oral, Daily   • spironolactone (ALDACTONE) 25 mg, Oral, Daily   • tamsulosin (FLOMAX) 0.4 MG capsule 24 hr capsule As needed   • VITAMIN D PO Oral, Daily       Assessment / Plan      Assessment & Plan:  1. Type 1 diabetes mellitus with hyperglycemia (HCC)  A1c increased, above goal.  Reviewed pump/CGM data and discussed with patient.  Sensor glucose average 174 for the past 2 weeks, 60% time in range , 0% <70.  Late morning/early afternoon hyperglycemia.  Basals are significantly lower at this time.  We increased basals from 7:30 AM-11:30 AM.  Discussed decreasing boluses for activity.  - POC Glycosylated Hemoglobin (Hb A1C)  - POC Glucose, Blood    2. Benign hypertension  BP okay, but on the low side.  May need to reduce medications further.  He will discuss with PCP.    3. Mixed hyperlipidemia  Continue rosuvastatin.    4. Primary hypothyroidism  Continue levothyroxine 50 mcg daily.    5. Presence of insulin pump      Return in about 3 months  (around 10/5/2022) for next scheduled follow up. He was advised to contact the office with any interval questions or concerns.    KRISTEN Sanford  Endocrinology  07/05/2022

## 2022-07-06 LAB — SARS-COV-2 RNA NOSE QL NAA+PROBE: NOT DETECTED

## 2022-07-07 ENCOUNTER — OFFICE VISIT (OUTPATIENT)
Dept: PULMONOLOGY | Facility: CLINIC | Age: 62
End: 2022-07-07

## 2022-07-07 VITALS
WEIGHT: 226 LBS | HEART RATE: 69 BPM | TEMPERATURE: 98.2 F | SYSTOLIC BLOOD PRESSURE: 126 MMHG | RESPIRATION RATE: 16 BRPM | OXYGEN SATURATION: 98 % | HEIGHT: 70 IN | DIASTOLIC BLOOD PRESSURE: 74 MMHG | BODY MASS INDEX: 32.35 KG/M2

## 2022-07-07 DIAGNOSIS — R94.2 ABNORMAL PFT: Primary | ICD-10-CM

## 2022-07-07 LAB
BASOPHILS # BLD AUTO: 0.05 10*3/MM3 (ref 0–0.2)
BASOPHILS NFR BLD AUTO: 0.8 % (ref 0–1.5)
DEPRECATED RDW RBC AUTO: 43 FL (ref 37–54)
EOSINOPHIL # BLD AUTO: 0.2 10*3/MM3 (ref 0–0.4)
EOSINOPHIL NFR BLD AUTO: 3.4 % (ref 0.3–6.2)
ERYTHROCYTE [DISTWIDTH] IN BLOOD BY AUTOMATED COUNT: 13.1 % (ref 12.3–15.4)
HCT VFR BLD AUTO: 37.1 % (ref 37.5–51)
HGB BLD-MCNC: 12.1 G/DL (ref 13–17.7)
IMM GRANULOCYTES # BLD AUTO: 0.07 10*3/MM3 (ref 0–0.05)
IMM GRANULOCYTES NFR BLD AUTO: 1.2 % (ref 0–0.5)
LYMPHOCYTES # BLD AUTO: 1.44 10*3/MM3 (ref 0.7–3.1)
LYMPHOCYTES NFR BLD AUTO: 24.1 % (ref 19.6–45.3)
MCH RBC QN AUTO: 29.7 PG (ref 26.6–33)
MCHC RBC AUTO-ENTMCNC: 32.6 G/DL (ref 31.5–35.7)
MCV RBC AUTO: 90.9 FL (ref 79–97)
MONOCYTES # BLD AUTO: 0.47 10*3/MM3 (ref 0.1–0.9)
MONOCYTES NFR BLD AUTO: 7.9 % (ref 5–12)
NEUTROPHILS NFR BLD AUTO: 3.74 10*3/MM3 (ref 1.7–7)
NEUTROPHILS NFR BLD AUTO: 62.6 % (ref 42.7–76)
NRBC BLD AUTO-RTO: 0 /100 WBC (ref 0–0.2)
PLATELET # BLD AUTO: 268 10*3/MM3 (ref 140–450)
PMV BLD AUTO: 10.8 FL (ref 6–12)
RBC # BLD AUTO: 4.08 10*6/MM3 (ref 4.14–5.8)
WBC NRBC COR # BLD: 5.97 10*3/MM3 (ref 3.4–10.8)

## 2022-07-07 PROCEDURE — 99204 OFFICE O/P NEW MOD 45 MIN: CPT | Performed by: INTERNAL MEDICINE

## 2022-07-07 PROCEDURE — 94375 RESPIRATORY FLOW VOLUME LOOP: CPT | Performed by: INTERNAL MEDICINE

## 2022-07-07 PROCEDURE — 94726 PLETHYSMOGRAPHY LUNG VOLUMES: CPT | Performed by: INTERNAL MEDICINE

## 2022-07-07 PROCEDURE — 85025 COMPLETE CBC W/AUTO DIFF WBC: CPT | Performed by: INTERNAL MEDICINE

## 2022-07-07 PROCEDURE — 94729 DIFFUSING CAPACITY: CPT | Performed by: INTERNAL MEDICINE

## 2022-07-07 PROCEDURE — 36415 COLL VENOUS BLD VENIPUNCTURE: CPT | Performed by: INTERNAL MEDICINE

## 2022-07-07 RX ORDER — CETIRIZINE HYDROCHLORIDE 10 MG/1
10 TABLET ORAL DAILY
COMMUNITY

## 2022-07-07 RX ORDER — FAMOTIDINE 20 MG/1
20 TABLET, FILM COATED ORAL 2 TIMES DAILY
COMMUNITY

## 2022-07-07 NOTE — PROGRESS NOTES
CC:    Shortness of breath    HPI:    63 y/o WM w/ h/o ~50 py smoking - quit , HTN, HLD, IDDM, Hypothyroidism, who comes today for evaluation of BRAUN.  Stress Echo done 3/24/22 showed no ischemia, EF 55-60%, normal valves, grade 1 diastolic dysfunction.  He had no desaturation during exercise with minimum sat of 96%.  Patient notes shortness of breath with minimal activity.  Went for a 10 minute walk with his daughter and had to stop and rest.  Symptoms have been present for months.  Not really worsening.  No cough or typical sounding asthmatic triggers.  No chest pain or palpitations.    INTERVAL HISTORY:        PMH:    Past Medical History:   Diagnosis Date   • Hypertensive disorder    • Hypoglycemia due to type 1 diabetes mellitus (HCC)    • Hypothyroidism    • Insulin pump in place    • Mixed hyperlipidemia    • Obesity    • Type 1 diabetes mellitus, uncontrolled    • Vitamin D deficiency      PSH:    Past Surgical History:   Procedure Laterality Date   • CATARACT EXTRACTION     • VASECTOMY       FH:    Family History   Problem Relation Age of Onset   • Hypertension Father    • Heart disease Mother    • Hypertension Mother    • Hypothyroidism Sister      SH:    Social History     Socioeconomic History   • Marital status:    Tobacco Use   • Smoking status: Former Smoker     Start date:      Quit date:      Years since quittin.5   • Smokeless tobacco: Never Used   Vaping Use   • Vaping Use: Never used   Substance and Sexual Activity   • Alcohol use: Yes     Comment: occasional   • Drug use: Never   • Sexual activity: Defer     ALLERGIES:    No Known Allergies  MEDICATIONS:      Current Outpatient Medications:   •  ACETAMINOPHEN PO, Take 1,000 mg by mouth Daily As Needed., Disp: , Rfl:   •  aspirin 81 MG chewable tablet, Chew 81 mg Daily., Disp: , Rfl:   •  celecoxib (CeleBREX) 200 MG capsule, Daily., Disp: , Rfl:   •  cetirizine (zyrTEC) 10 MG tablet, Take 10 mg by mouth Daily., Disp: , Rfl:    •  Continuous Blood Gluc Sensor (Dexcom G6 Sensor), Every 10 (Ten) Days., Disp: , Rfl:   •  Euthyrox 50 MCG tablet, Take 1 tablet by mouth once daily, Disp: 30 tablet, Rfl: 5  •  famotidine (PEPCID) 20 MG tablet, Take 20 mg by mouth 2 (Two) Times a Day., Disp: , Rfl:   •  glucose blood test strip, 1 strip 4 (Four) Times a Day. As needed - on cgm, Disp: , Rfl:   •  hydroCHLOROthiazide (HYDRODIURIL) 25 MG tablet, Daily., Disp: , Rfl:   •  Insulin Aspart (NovoLOG) 100 UNIT/ML injection,  UNITS DAILY IN INSULIN PUMP, Disp: 30 mL, Rfl: 11  •  Insulin Infusion Pump (T:slim X2 Insulin Pump) device, Use as directed with Humalog insulin, Disp: , Rfl:   •  Insulin Infusion Pump Supplies (T:slim X2 3mL Cartridge) misc, Use as directed in Tslim pump, Disp: , Rfl:   •  olmesartan (BENICAR) 20 MG tablet, Take 20 mg by mouth Daily., Disp: , Rfl:   •  rosuvastatin (CRESTOR) 10 MG tablet, Take 1 tablet by mouth Daily., Disp: 90 tablet, Rfl: 3  •  spironolactone (ALDACTONE) 50 MG tablet, Take 25 mg by mouth Daily., Disp: , Rfl:   •  tamsulosin (FLOMAX) 0.4 MG capsule 24 hr capsule, As needed, Disp: , Rfl:   •  VITAMIN D PO, Take  by mouth Daily., Disp: , Rfl:     ROS:  Per HPI    DIAGNOSTIC DATA (Reviewed and interpreted by me unless otherwise specified):    PFT 7/7/22 - no obstruction, no restriction, mild reduction in DLCO corrects for alveolar volume    Vitals:    07/07/22 0846   BP: 126/74   Pulse: 69   Resp: 16   Temp: 98.2 °F (36.8 °C)   SpO2: 98%       Physical Exam:    Constitutional: Alert, no Distress  Mouth/Throat: Oropharynx is clear and moist.   Cardiovascular: Normal rate, regular rhythm, normal heart sounds.   Pulmonary/Chest: Effort normal and breath sounds normal.  No clubbing.     Assessment & Plan     Dyspnea on Exertion  Prior Tobacco Abuse ~50 py quit 1997  Patient has mild isolated impairment in DLCO.  No desaturation during stress Echo and no signs / symptoms of PE argues against venous thromboembolic  disease.  Get HRCT to r/o subtle ILD.  Check Hgb (don't see any recently) to r/o anemia.  Give trial of Trelegy 100 to see if this improves symptoms.  Suspect combination of mild emphysema, diastolic dysfunction, sub-optimal HRR to exercise (peak  during stress test - not on beta blocker), and deconditioning are to blame.    RTC in 4-6 weeks w/ HRCT and to re-assess response to Trelegy    Will notify if anemic    Rafita Huber MD  Pulmonology and Critical Care Medicine  07/07/22 14:51 EDT  Electronically Signed    C.C.:    KRISTEN Serrano, Corine Jonas PA

## 2022-07-14 ENCOUNTER — HOSPITAL ENCOUNTER (OUTPATIENT)
Dept: CT IMAGING | Facility: HOSPITAL | Age: 62
Discharge: HOME | End: 2022-07-14
Admitting: INTERNAL MEDICINE

## 2022-07-14 DIAGNOSIS — R94.2 ABNORMAL PFT: ICD-10-CM

## 2022-07-14 PROCEDURE — 71250 CT THORAX DX C-: CPT

## 2022-07-26 ENCOUNTER — DOCUMENTATION (OUTPATIENT)
Dept: ENDOCRINOLOGY | Facility: CLINIC | Age: 62
End: 2022-07-26

## 2022-07-27 NOTE — PROGRESS NOTES
Patient called after hours.  His pump stopped working.  He has contacted tandem and they will be sending him a new pump, not to arrive for two days.  Recent  and he just took a dose of NovoLog.  I called in Lantus 40 units once daily, pen needles, syringes.  Advised him to use NovoLog 1: 7 g of carbs and 1: 50 greater than 150.  Continue this regimen until he receives his pump and resume use of pump just under 24 hours after his l ast dose of basal insulin.  Patient expressed understanding.

## 2022-08-29 ENCOUNTER — OFFICE VISIT (OUTPATIENT)
Dept: PULMONOLOGY | Facility: CLINIC | Age: 62
End: 2022-08-29

## 2022-08-29 VITALS
TEMPERATURE: 97.9 F | OXYGEN SATURATION: 98 % | DIASTOLIC BLOOD PRESSURE: 58 MMHG | HEART RATE: 74 BPM | SYSTOLIC BLOOD PRESSURE: 124 MMHG | BODY MASS INDEX: 32.07 KG/M2 | HEIGHT: 70 IN | WEIGHT: 224 LBS

## 2022-08-29 DIAGNOSIS — J45.40 MODERATE PERSISTENT ASTHMA, UNSPECIFIED WHETHER COMPLICATED: Primary | ICD-10-CM

## 2022-08-29 PROCEDURE — 99214 OFFICE O/P EST MOD 30 MIN: CPT | Performed by: INTERNAL MEDICINE

## 2022-08-29 NOTE — PROGRESS NOTES
CC:    Shortness of breath    HPI:    61 y/o WM w/ h/o ~50 py smoking - quit , HTN, HLD, IDDM, Hypothyroidism, who comes today for evaluation of BRAUN.  Stress Echo done 3/24/22 showed no ischemia, EF 55-60%, normal valves, grade 1 diastolic dysfunction.  He had no desaturation during exercise with minimum sat of 96%.  Patient notes shortness of breath with minimal activity.  Went for a 10 minute walk with his daughter and had to stop and rest.  Symptoms have been present for months.  Not really worsening.  No cough or typical sounding asthmatic triggers.  No chest pain or palpitations.    INTERVAL HISTORY:    Patient returns today for follow up.  Trelegy has helped symptoms some.  He can tell if he misses a dose.      PMH:    Past Medical History:   Diagnosis Date   • Hypertensive disorder    • Hypoglycemia due to type 1 diabetes mellitus (HCC)    • Hypothyroidism    • Insulin pump in place    • Mixed hyperlipidemia    • Obesity    • Type 1 diabetes mellitus, uncontrolled    • Vitamin D deficiency      PSH:    Past Surgical History:   Procedure Laterality Date   • CATARACT EXTRACTION     • VASECTOMY       FH:    Family History   Problem Relation Age of Onset   • Hypertension Father    • Heart disease Mother    • Hypertension Mother    • Hypothyroidism Sister      SH:    Social History     Socioeconomic History   • Marital status:    Tobacco Use   • Smoking status: Former Smoker     Packs/day: 2.00     Years: 25.00     Pack years: 50.00     Start date:      Quit date:      Years since quittin.6   • Smokeless tobacco: Never Used   Vaping Use   • Vaping Use: Never used   Substance and Sexual Activity   • Alcohol use: Yes     Comment: occasional   • Drug use: Never   • Sexual activity: Defer     ALLERGIES:    No Known Allergies  MEDICATIONS:      Current Outpatient Medications:   •  ACETAMINOPHEN PO, Take 1,000 mg by mouth Daily As Needed., Disp: , Rfl:   •  aspirin 81 MG chewable tablet, Chew 81  mg Daily., Disp: , Rfl:   •  celecoxib (CeleBREX) 200 MG capsule, Daily., Disp: , Rfl:   •  cetirizine (zyrTEC) 10 MG tablet, Take 10 mg by mouth Daily., Disp: , Rfl:   •  Continuous Blood Gluc Sensor (Dexcom G6 Sensor), Every 10 (Ten) Days., Disp: , Rfl:   •  Euthyrox 50 MCG tablet, Take 1 tablet by mouth once daily, Disp: 30 tablet, Rfl: 5  •  famotidine (PEPCID) 20 MG tablet, Take 20 mg by mouth 2 (Two) Times a Day., Disp: , Rfl:   •  Fluticasone-Umeclidin-Vilant (Trelegy Ellipta) 100-62.5-25 MCG/INH inhaler, Inhale 1 puff Daily., Disp: 1 each, Rfl: 11  •  glucose blood test strip, 1 strip 4 (Four) Times a Day. As needed - on cgm, Disp: , Rfl:   •  hydroCHLOROthiazide (HYDRODIURIL) 25 MG tablet, Daily., Disp: , Rfl:   •  Insulin Aspart (NovoLOG) 100 UNIT/ML injection,  UNITS DAILY IN INSULIN PUMP, Disp: 30 mL, Rfl: 11  •  Insulin Infusion Pump (T:slim X2 Insulin Pump) device, Use as directed with Humalog insulin, Disp: , Rfl:   •  Insulin Infusion Pump Supplies (T:slim X2 3mL Cartridge) misc, Use as directed in Tslim pump, Disp: , Rfl:   •  olmesartan (BENICAR) 20 MG tablet, Take 20 mg by mouth Daily., Disp: , Rfl:   •  rosuvastatin (CRESTOR) 10 MG tablet, Take 1 tablet by mouth Daily., Disp: 90 tablet, Rfl: 3  •  spironolactone (ALDACTONE) 50 MG tablet, Take 25 mg by mouth Daily., Disp: , Rfl:   •  tamsulosin (FLOMAX) 0.4 MG capsule 24 hr capsule, As needed, Disp: , Rfl:   •  VITAMIN D PO, Take  by mouth Daily., Disp: , Rfl:     ROS:  Per HPI    DIAGNOSTIC DATA (Reviewed and interpreted by me unless otherwise specified):    PFT 7/7/22 - no obstruction, no restriction, mild reduction in DLCO corrects for alveolar volume    HRCT 7/14/22 - mild upper lobe predominant emphysema, mosaic attenuation on expiratory images, no ILD, evidence of prior granulomatous disease    Vitals:    08/29/22 0856   BP: 124/58   Pulse: 74   Temp: 97.9 °F (36.6 °C)   SpO2: 98%       Physical Exam:    Constitutional: Alert, no  Distress  Mouth/Throat: Oropharynx is clear and moist.   Cardiovascular: Normal rate, regular rhythm, normal heart sounds.   Pulmonary/Chest: Effort normal and breath sounds normal.  No clubbing.     Assessment & Plan     Dyspnea on Exertion  Prior Tobacco Abuse ~50 py quit 1997  Emphysema - on CT  Asthma  Patient has mild isolated impairment in DLCO.  No desaturation during stress Echo and no signs / symptoms of PE argues against venous thromboembolic disease.  Suspect combination of Asthma, mild emphysema, diastolic dysfunction, sub-optimal HRR to exercise (peak  during stress test - not on beta blocker), and deconditioning are to blame.    Symptoms did improve with Trelegy 100, will continue that.    Anemia Hgb 12.1  Not enough to explain symptoms, but does need follow up.  Has not had a screening colonoscopy, strongly encouraged him to get this done and follow up with his PCP regarding the matter.     RTC 6 months w/ amanda    Rafita Huber MD  Pulmonology and Critical Care Medicine  08/29/22 12:53 EDT  Electronically Signed    C.C.:    No ref. provider found, Corine Jonas PA

## 2022-10-17 RX ORDER — LEVOTHYROXINE SODIUM 0.05 MG/1
TABLET ORAL
Qty: 90 TABLET | Refills: 0 | Status: SHIPPED | OUTPATIENT
Start: 2022-10-17 | End: 2023-02-23

## 2022-10-17 NOTE — TELEPHONE ENCOUNTER
Last office visit 07/05/2022.  Follow up scheduled for 10/18/2022.      Please advise.  Patient scheduled for an appointment tomorrow.

## 2022-10-18 ENCOUNTER — OFFICE VISIT (OUTPATIENT)
Dept: ENDOCRINOLOGY | Facility: CLINIC | Age: 62
End: 2022-10-18

## 2022-10-18 VITALS
OXYGEN SATURATION: 98 % | DIASTOLIC BLOOD PRESSURE: 76 MMHG | HEART RATE: 87 BPM | WEIGHT: 227 LBS | HEIGHT: 70 IN | SYSTOLIC BLOOD PRESSURE: 116 MMHG | BODY MASS INDEX: 32.5 KG/M2

## 2022-10-18 DIAGNOSIS — E10.65 TYPE 1 DIABETES MELLITUS WITH HYPERGLYCEMIA: Primary | Chronic | ICD-10-CM

## 2022-10-18 DIAGNOSIS — Z96.41 PRESENCE OF INSULIN PUMP: ICD-10-CM

## 2022-10-18 LAB
EXPIRATION DATE: NORMAL
EXPIRATION DATE: NORMAL
GLUCOSE BLDC GLUCOMTR-MCNC: 120 MG/DL (ref 70–130)
HBA1C MFR BLD: 7.6 %
Lab: NORMAL
Lab: NORMAL

## 2022-10-18 PROCEDURE — 99213 OFFICE O/P EST LOW 20 MIN: CPT | Performed by: PHYSICIAN ASSISTANT

## 2022-10-18 PROCEDURE — 3051F HG A1C>EQUAL 7.0%<8.0%: CPT | Performed by: PHYSICIAN ASSISTANT

## 2022-10-18 PROCEDURE — 83036 HEMOGLOBIN GLYCOSYLATED A1C: CPT | Performed by: PHYSICIAN ASSISTANT

## 2022-10-18 PROCEDURE — 95251 CONT GLUC MNTR ANALYSIS I&R: CPT | Performed by: PHYSICIAN ASSISTANT

## 2022-10-18 RX ORDER — INSULIN GLARGINE 100 [IU]/ML
INJECTION, SOLUTION SUBCUTANEOUS
Start: 2022-10-18

## 2022-12-06 DIAGNOSIS — E10.65 TYPE 1 DIABETES MELLITUS WITH HYPERGLYCEMIA: Chronic | ICD-10-CM

## 2022-12-06 RX ORDER — INSULIN ASPART 100 [IU]/ML
INJECTION, SOLUTION INTRAVENOUS; SUBCUTANEOUS
Qty: 30 ML | Refills: 0 | OUTPATIENT
Start: 2022-12-06

## 2022-12-13 DIAGNOSIS — E78.2 MIXED HYPERLIPIDEMIA: Chronic | ICD-10-CM

## 2022-12-13 RX ORDER — ROSUVASTATIN CALCIUM 10 MG/1
10 TABLET, COATED ORAL DAILY
Qty: 90 TABLET | Refills: 1 | Status: SHIPPED | OUTPATIENT
Start: 2022-12-13

## 2023-02-15 ENCOUNTER — OFFICE VISIT (OUTPATIENT)
Dept: ENDOCRINOLOGY | Facility: CLINIC | Age: 63
End: 2023-02-15
Payer: MEDICARE

## 2023-02-15 VITALS
OXYGEN SATURATION: 98 % | HEIGHT: 70 IN | SYSTOLIC BLOOD PRESSURE: 122 MMHG | HEART RATE: 69 BPM | BODY MASS INDEX: 32.64 KG/M2 | DIASTOLIC BLOOD PRESSURE: 64 MMHG | WEIGHT: 228 LBS

## 2023-02-15 DIAGNOSIS — I10 BENIGN HYPERTENSION: Chronic | ICD-10-CM

## 2023-02-15 DIAGNOSIS — Z96.41 PRESENCE OF INSULIN PUMP: ICD-10-CM

## 2023-02-15 DIAGNOSIS — E66.9 CLASS 1 OBESITY WITH SERIOUS COMORBIDITY AND BODY MASS INDEX (BMI) OF 32.0 TO 32.9 IN ADULT, UNSPECIFIED OBESITY TYPE: ICD-10-CM

## 2023-02-15 DIAGNOSIS — E03.9 PRIMARY HYPOTHYROIDISM: Chronic | ICD-10-CM

## 2023-02-15 DIAGNOSIS — E10.65 TYPE 1 DIABETES MELLITUS WITH HYPERGLYCEMIA: Primary | Chronic | ICD-10-CM

## 2023-02-15 DIAGNOSIS — E78.2 MIXED HYPERLIPIDEMIA: Chronic | ICD-10-CM

## 2023-02-15 LAB
ALBUMIN SERPL-MCNC: 4.5 G/DL (ref 3.5–5.2)
ALBUMIN UR-MCNC: 1.8 MG/DL
ALBUMIN/GLOB SERPL: 1.5 G/DL
ALP SERPL-CCNC: 90 U/L (ref 39–117)
ALT SERPL W P-5'-P-CCNC: 16 U/L (ref 1–41)
ANION GAP SERPL CALCULATED.3IONS-SCNC: 8 MMOL/L (ref 5–15)
AST SERPL-CCNC: 19 U/L (ref 1–40)
BILIRUB SERPL-MCNC: 0.5 MG/DL (ref 0–1.2)
BUN SERPL-MCNC: 23 MG/DL (ref 8–23)
BUN/CREAT SERPL: 19.5 (ref 7–25)
CALCIUM SPEC-SCNC: 9.6 MG/DL (ref 8.6–10.5)
CHLORIDE SERPL-SCNC: 101 MMOL/L (ref 98–107)
CHOLEST SERPL-MCNC: 127 MG/DL (ref 0–200)
CO2 SERPL-SCNC: 27 MMOL/L (ref 22–29)
CREAT SERPL-MCNC: 1.18 MG/DL (ref 0.76–1.27)
CREAT UR-MCNC: 156.6 MG/DL
EGFRCR SERPLBLD CKD-EPI 2021: 69.8 ML/MIN/1.73
EXPIRATION DATE: NORMAL
GLOBULIN UR ELPH-MCNC: 3 GM/DL
GLUCOSE SERPL-MCNC: 142 MG/DL (ref 65–99)
HBA1C MFR BLD: 7.7 %
HDLC SERPL-MCNC: 46 MG/DL (ref 40–60)
LDLC SERPL CALC-MCNC: 62 MG/DL (ref 0–100)
LDLC/HDLC SERPL: 1.33 {RATIO}
Lab: NORMAL
MICROALBUMIN/CREAT UR: 11.5 MG/G
POTASSIUM SERPL-SCNC: 4.9 MMOL/L (ref 3.5–5.2)
PROT SERPL-MCNC: 7.5 G/DL (ref 6–8.5)
SODIUM SERPL-SCNC: 136 MMOL/L (ref 136–145)
TRIGL SERPL-MCNC: 100 MG/DL (ref 0–150)
TSH SERPL DL<=0.05 MIU/L-ACNC: 3.1 UIU/ML (ref 0.27–4.2)
VLDLC SERPL-MCNC: 19 MG/DL (ref 5–40)

## 2023-02-15 PROCEDURE — 99214 OFFICE O/P EST MOD 30 MIN: CPT | Performed by: PHYSICIAN ASSISTANT

## 2023-02-15 PROCEDURE — 83036 HEMOGLOBIN GLYCOSYLATED A1C: CPT | Performed by: PHYSICIAN ASSISTANT

## 2023-02-15 PROCEDURE — 82043 UR ALBUMIN QUANTITATIVE: CPT | Performed by: PHYSICIAN ASSISTANT

## 2023-02-15 PROCEDURE — 95251 CONT GLUC MNTR ANALYSIS I&R: CPT | Performed by: PHYSICIAN ASSISTANT

## 2023-02-15 PROCEDURE — 3051F HG A1C>EQUAL 7.0%<8.0%: CPT | Performed by: PHYSICIAN ASSISTANT

## 2023-02-15 PROCEDURE — 80053 COMPREHEN METABOLIC PANEL: CPT | Performed by: PHYSICIAN ASSISTANT

## 2023-02-15 PROCEDURE — 80061 LIPID PANEL: CPT | Performed by: PHYSICIAN ASSISTANT

## 2023-02-15 PROCEDURE — 84443 ASSAY THYROID STIM HORMONE: CPT | Performed by: PHYSICIAN ASSISTANT

## 2023-02-15 PROCEDURE — 82570 ASSAY OF URINE CREATININE: CPT | Performed by: PHYSICIAN ASSISTANT

## 2023-02-15 PROCEDURE — 36415 COLL VENOUS BLD VENIPUNCTURE: CPT | Performed by: PHYSICIAN ASSISTANT

## 2023-02-15 RX ORDER — SEMAGLUTIDE 1.34 MG/ML
0.5 INJECTION, SOLUTION SUBCUTANEOUS WEEKLY
Qty: 1.5 ML | Refills: 3 | Status: SHIPPED | OUTPATIENT
Start: 2023-02-15

## 2023-02-15 NOTE — PROGRESS NOTES
"     Office Note      Date: 02/15/2023  Patient Name: Alfredo Oconnor  MRN: [id number]  : 1960    Chief Complaint   Patient presents with   • Diabetes       History of Present Illness  Alfredo Oconnor is a 62 y.o. male who presents today for follow up on type 1 diabetes.   Diabetes was diagnosed in .  Known diabetic complications: retinopathy.  He remains on the Tandem X2 insulin pump.  He is using the Dexcom G6 CGM and the Control-IQ program (hybrid closed-loop).  He is monitoring glucose ~288 times per day using CGM.  He is frequently adjusting insulin based on glucose readings.  He denies any severe hypoglycemia.  He reports that he received new pump about 2 weeks ago.  He reports that he has been more active working on car in the afternoons.  He notes difficulty losing weight.  He asks about GLP-1 Hermelinda.  Feet: No sores or other problems.  Foot exam up to date.  Last eye exam: 2021. Mild NPDR, trace DME OD - stable.  ACE inhibitor/ARB: Olmesartan.  Statin: Rosuvastatin.    Review of systems  As noted in HPI.    Past Medical History:   Diagnosis Date   • Hypertensive disorder    • Hypoglycemia due to type 1 diabetes mellitus (HCC)    • Hypothyroidism    • Insulin pump in place    • Mixed hyperlipidemia    • Obesity    • Type 1 diabetes mellitus, uncontrolled    • Vitamin D deficiency       Past Surgical History:   Procedure Laterality Date   • CATARACT EXTRACTION     • VASECTOMY       The following portions of the patient's history were reviewed and updated as appropriate: allergies, current medications, past family history, past medical history, past social history, past surgical history and problem list.    Vitals:  /64   Pulse 69   Ht 177.8 cm (70\")   Wt 103 kg (228 lb)   SpO2 98%   BMI 32.71 kg/m²     Physical Exam  Vitals reviewed.   Constitutional:       General: He is not in acute distress.  Neurological:      Mental Status: He is alert and oriented to person, place, and time. "   Psychiatric:         Mood and Affect: Affect normal.       Labs/Imaging    Hemoglobin A1c  Lab Results   Component Value Date    HGBA1C 7.7 02/15/2023    HGBA1C 7.6 10/18/2022    HGBA1C 8.1 07/05/2022     Glucose  Lab Results   Component Value Date    POCGLU 120 10/18/2022       Current Outpatient Medications   Medication Instructions   • ACETAMINOPHEN PO 1,000 mg, Oral, Daily PRN   • aspirin 81 mg, Oral, Daily   • celecoxib (CeleBREX) 200 MG capsule Daily   • cetirizine (ZYRTEC) 10 mg, Oral, Daily   • Continuous Blood Gluc Sensor (Dexcom G6 Sensor) Does not apply, Every 10 Days   • famotidine (PEPCID) 20 mg, Oral, 2 Times Daily   • Fluticasone-Umeclidin-Vilant (Trelegy Ellipta) 100-62.5-25 MCG/INH inhaler 1 puff, Inhalation, Daily - RT   • glucose blood test strip 1 strip, 4 Times Daily, As needed - on cgm   • hydroCHLOROthiazide (HYDRODIURIL) 25 MG tablet Daily   • Insulin Aspart (NovoLOG) 100 UNIT/ML injection  UNITS DAILY IN INSULIN PUMP   • Insulin Glargine (Lantus SoloStar) 100 UNIT/ML injection pen PRN off insulin pump, 40 units once daily   • Insulin Infusion Pump (T:slim X2 Insulin Pump) device Does not apply, Use as directed with Humalog insulin    • Insulin Infusion Pump Supplies (T:slim X2 3mL Cartridge) misc Does not apply, Use as directed in Tslim pump    • levothyroxine (SYNTHROID, LEVOTHROID) 50 MCG tablet Take 1 tablet by mouth once daily   • olmesartan (BENICAR) 20 mg, Oral, Daily   • Ozempic (0.25 or 0.5 MG/DOSE) 0.5 mg, Subcutaneous, Weekly   • rosuvastatin (CRESTOR) 10 mg, Oral, Daily   • spironolactone (ALDACTONE) 25 mg, Oral, Daily   • tamsulosin (FLOMAX) 0.4 MG capsule 24 hr capsule As needed   • VITAMIN D PO Oral, Daily       Assessment & Plan  1. Type 1 diabetes mellitus with hyperglycemia (HCC)  A1c above goal.  Reviewed pump/CGM data and discussed with patient.  Sensor glucose average 169 for the past 2 weeks, 63% time in range , 1% low <70.  Average insulin TDD 80.25  units, 53% basal, 42% food bolus, 1% correction bolus, 5% Control-IQ auto bolus.  Recommend to try extending boluses with dinner.  We discussed decreasing boluses before activity and use of exercise mode.  We discussed GLP-1 Hermelinda.  He understands that this is off label with type 1 diabetes.  Add Ozempic.  Potential side effects reviewed.  Start at 0.25 mg weekly for 4 weeks, then increase if tolerating okay to 0.5 mg weekly.  Continue to monitor blood sugars closely, we may need to adjust pump settings.  - POC Glycosylated Hemoglobin (Hb A1C)  - Comprehensive Metabolic Panel; Future  - Microalbumin / Creatinine Urine Ratio - Urine, Clean Catch; Future    2. Primary hypothyroidism  Continue levothyroxine 50 mcg daily.  Check TSH.  - TSH; Future    3. Mixed hyperlipidemia  Continue rosuvastatin.  Check fasting lipids.  - Lipid Panel; Future    4. Benign hypertension  BP controlled.  Continue current treatment.    5. Class 1 obesity with serious comorbidity and body mass index (BMI) of 32.0 to 32.9 in adult, unspecified obesity type  Encouraged nutritious dietary choices, moderation of carbohydrates, avoidance of added sugar, caloric restriction and regular exercise.    6. Presence of insulin pump      Return in about 3 months (around 5/15/2023) for next scheduled follow up. He was advised to contact the office with any interval questions or concerns.    KRISTEN Sanford  Endocrinology  02/15/2023

## 2023-02-23 RX ORDER — LEVOTHYROXINE SODIUM 0.05 MG/1
TABLET ORAL
Qty: 90 TABLET | Refills: 3 | Status: SHIPPED | OUTPATIENT
Start: 2023-02-23

## 2023-03-02 ENCOUNTER — PRIOR AUTHORIZATION (OUTPATIENT)
Dept: ENDOCRINOLOGY | Facility: CLINIC | Age: 63
End: 2023-03-02
Payer: MEDICARE

## 2023-03-02 NOTE — TELEPHONE ENCOUNTER
Alfredo Oconnor (Madsen: [id number])  Rx #: [id number]  Ozempic (0.25 or 0.5 MG/DOSE) 2MG/1.5ML pen-injectors     Form  [facility] General Prior Authorization Request Form   Plan Contact  [phone] phone  [phone] fax  Created  15 days ago  Sent to Plan  14 days ago  Determination  Favorable  20 hours ago

## 2023-05-26 ENCOUNTER — OFFICE VISIT (OUTPATIENT)
Dept: ENDOCRINOLOGY | Facility: CLINIC | Age: 63
End: 2023-05-26
Payer: MEDICARE

## 2023-05-26 VITALS
HEART RATE: 70 BPM | HEIGHT: 70 IN | WEIGHT: 232 LBS | OXYGEN SATURATION: 95 % | SYSTOLIC BLOOD PRESSURE: 110 MMHG | BODY MASS INDEX: 33.21 KG/M2 | DIASTOLIC BLOOD PRESSURE: 68 MMHG

## 2023-05-26 DIAGNOSIS — E78.2 MIXED HYPERLIPIDEMIA: Chronic | ICD-10-CM

## 2023-05-26 DIAGNOSIS — E10.65 TYPE 1 DIABETES MELLITUS WITH HYPERGLYCEMIA: Primary | Chronic | ICD-10-CM

## 2023-05-26 DIAGNOSIS — Z96.41 PRESENCE OF INSULIN PUMP: ICD-10-CM

## 2023-05-26 LAB
EXPIRATION DATE: ABNORMAL
EXPIRATION DATE: NORMAL
GLUCOSE BLDC GLUCOMTR-MCNC: 179 MG/DL (ref 70–130)
HBA1C MFR BLD: 8.1 %
Lab: ABNORMAL
Lab: NORMAL

## 2023-05-26 PROCEDURE — 3074F SYST BP LT 130 MM HG: CPT | Performed by: PHYSICIAN ASSISTANT

## 2023-05-26 PROCEDURE — 1159F MED LIST DOCD IN RCRD: CPT | Performed by: PHYSICIAN ASSISTANT

## 2023-05-26 PROCEDURE — 95251 CONT GLUC MNTR ANALYSIS I&R: CPT | Performed by: PHYSICIAN ASSISTANT

## 2023-05-26 PROCEDURE — 83036 HEMOGLOBIN GLYCOSYLATED A1C: CPT | Performed by: PHYSICIAN ASSISTANT

## 2023-05-26 PROCEDURE — 3052F HG A1C>EQUAL 8.0%<EQUAL 9.0%: CPT | Performed by: PHYSICIAN ASSISTANT

## 2023-05-26 PROCEDURE — 1160F RVW MEDS BY RX/DR IN RCRD: CPT | Performed by: PHYSICIAN ASSISTANT

## 2023-05-26 PROCEDURE — 99214 OFFICE O/P EST MOD 30 MIN: CPT | Performed by: PHYSICIAN ASSISTANT

## 2023-05-26 PROCEDURE — 3078F DIAST BP <80 MM HG: CPT | Performed by: PHYSICIAN ASSISTANT

## 2023-05-26 RX ORDER — ROSUVASTATIN CALCIUM 10 MG/1
10 TABLET, COATED ORAL DAILY
Qty: 90 TABLET | Refills: 3 | Status: SHIPPED | OUTPATIENT
Start: 2023-05-26

## 2023-05-26 RX ORDER — INSULIN ASPART 100 [IU]/ML
INJECTION, SOLUTION INTRAVENOUS; SUBCUTANEOUS
Qty: 30 ML | Refills: 11 | Status: SHIPPED | OUTPATIENT
Start: 2023-05-26

## 2023-05-26 NOTE — PROGRESS NOTES
"     Office Note      Date: 2023  Patient Name: Alfredo Oconnor  MRN: [id number]  : 1960    Chief Complaint   Patient presents with    Diabetes       History of Present Illness  Alfredo Oconnor is a 63 y.o. male who presents today for follow up on type 1 diabetes.   Diabetes was diagnosed in .  Known diabetic complications: retinopathy.  He remains on the Tandem X2 insulin pump.  He is using the Dexcom G6 CGM and the Control-IQ program (automated insulin delivery).  He is monitoring glucose ~288 times per day using CGM.  He is frequently adjusting insulin based on glucose readings and carb counting.  He reports less frequent hypoglycemia.  He denies any severe hypoglycemia.  He did not start the Ozempic.  He read that it was not indicated for Type 1 diabetes.  We discussed off-label use last visit.  He reports BGs have been better in the past couple of weeks.  Feet: No sores or issues.  Foot exam up to date.  Last eye exam: due - 2021. Mild NPDR, trace DME OD - stable.  ACE inhibitor/ARB: Olmesartan.  Statin: Rosuvastatin.    Review of systems  As noted in HPI.    Past Medical History:   Diagnosis Date    Hypertensive disorder     Hypoglycemia due to type 1 diabetes mellitus     Hypothyroidism     Insulin pump in place     Mixed hyperlipidemia     Obesity     Type 1 diabetes mellitus, uncontrolled     Vitamin D deficiency       Past Surgical History:   Procedure Laterality Date    CATARACT EXTRACTION      VASECTOMY       The following portions of the patient's history were reviewed and updated as appropriate: allergies, current medications, past family history, past medical history, past social history, past surgical history and problem list.    Vitals:  /68 (BP Location: Left arm, Patient Position: Sitting, Cuff Size: Adult)   Pulse 70   Ht 177.8 cm (70\")   Wt 105 kg (232 lb)   SpO2 95%   BMI 33.29 kg/m²     Physical Exam  Vitals reviewed.   Constitutional:       General: He is not in " acute distress.  Neurological:      Mental Status: He is alert and oriented to person, place, and time.   Psychiatric:         Mood and Affect: Affect normal.       Labs/Imaging    Hemoglobin A1c  Lab Results   Component Value Date    HGBA1C 8.1 05/26/2023    HGBA1C 7.7 02/15/2023    HGBA1C 7.6 10/18/2022     Glucose  Lab Results   Component Value Date    POCGLU 179 (A) 05/26/2023     CMP  Lab Results   Component Value Date    GLUCOSE 142 (H) 02/15/2023    BUN 23 02/15/2023    CREATININE 1.18 02/15/2023    EGFRIFNONA 87 12/17/2021    EGFR 69.8 02/15/2023    BCR 19.5 02/15/2023     02/15/2023    K 4.9 02/15/2023    CO2 27.0 02/15/2023    CALCIUM 9.6 02/15/2023    ALBUMIN 4.5 02/15/2023    BILITOT 0.5 02/15/2023    ALKPHOS 90 02/15/2023    AST 19 02/15/2023    ALT 16 02/15/2023     Lipid panel  Lab Results   Component Value Date    CHOL 127 02/15/2023    TRIG 100 02/15/2023    HDL 46 02/15/2023    LDL 62 02/15/2023     Urine microalbumin/creatinine ratio  Lab Results   Component Value Date    MALBCRERATIO 11.5 02/15/2023     Thyroid  Lab Results   Component Value Date    TSH 3.100 02/15/2023       Current Outpatient Medications   Medication Instructions    ACETAMINOPHEN PO 1,000 mg, Oral, Daily PRN    aspirin 81 mg, Oral, Daily    celecoxib (CeleBREX) 200 MG capsule Daily    cetirizine (ZYRTEC) 10 mg, Oral, Daily    Continuous Blood Gluc Sensor (Dexcom G6 Sensor) Does not apply, Every 10 Days    famotidine (PEPCID) 20 mg, Oral, 2 Times Daily    Fluticasone-Umeclidin-Vilant (Trelegy Ellipta) 100-62.5-25 MCG/INH inhaler 1 puff, Inhalation, Daily - RT    glucose blood test strip 1 strip, 4 Times Daily, As needed - on cgm    hydroCHLOROthiazide (HYDRODIURIL) 25 MG tablet Daily    Insulin Aspart (NovoLOG) 100 UNIT/ML injection  UNITS DAILY IN INSULIN PUMP    Insulin Glargine (Lantus SoloStar) 100 UNIT/ML injection pen PRN off insulin pump, 40 units once daily    Insulin Infusion Pump (T:slim X2 Insulin Pump)  device Does not apply, Use as directed with Humalog insulin     Insulin Infusion Pump Supplies (T:slim X2 3mL Cartridge) misc Does not apply, Use as directed in Tslim pump     levothyroxine (SYNTHROID, LEVOTHROID) 50 MCG tablet Take 1 tablet by mouth once daily    olmesartan (BENICAR) 20 mg, Oral, Daily    Ozempic (0.25 or 0.5 MG/DOSE) 0.5 mg, Subcutaneous, Weekly    rosuvastatin (CRESTOR) 10 mg, Oral, Daily    spironolactone (ALDACTONE) 25 mg, Oral, Daily    tamsulosin (FLOMAX) 0.4 MG capsule 24 hr capsule As needed    VITAMIN D PO Oral, Daily       Assessment & Plan  Diagnoses and all orders for this visit:    1. Type 1 diabetes mellitus with hyperglycemia (Primary)  Assessment & Plan:  Diabetes is uncontrolled with hyperglycemia.  A1c above goal, increased to 8.1% today.  Reviewed pump/CGM data and discussed with patient.  Sensor glucose average 171 for the past 2 weeks, 62% time in range , 0% low <70.  Decrease basal to 1.4 from 4 to 7 PM.  Recommend to bolus 10 minutes before meals.  We discussed decreasing boluses for physical activity and snacks prior to physical activity.  Discussed Ozempic.  He will start 0.25 mg once weekly for 4 weeks, then increase if tolerating okay to 0.5 mg weekly.  Encouraged to schedule eye exam.  Diabetes will be reassessed in 3 months.    Orders:  -     POC Glucose, Blood  -     POC Glycosylated Hemoglobin (Hb A1C)  -     Insulin Aspart (NovoLOG) 100 UNIT/ML injection;  UNITS DAILY IN INSULIN PUMP  Dispense: 30 mL; Refill: 11    2. Mixed hyperlipidemia  Assessment & Plan:  Lipids up to date.  LDL at goal 62.  Continue rosuvastatin 10 mg daily.    Orders:  -     rosuvastatin (CRESTOR) 10 MG tablet; Take 1 tablet by mouth Daily.  Dispense: 90 tablet; Refill: 3    3. Presence of insulin pump        Return in about 3 months (around 8/26/2023) for next scheduled follow up. He was advised to contact the office with any interval questions or concerns.    Hyacinth Charles,  PA  Endocrinology  05/26/2023

## 2023-06-13 NOTE — ASSESSMENT & PLAN NOTE
Diabetes is uncontrolled with hyperglycemia.  A1c above goal, increased to 8.1% today.  Reviewed pump/CGM data and discussed with patient.  Sensor glucose average 171 for the past 2 weeks, 62% time in range , 0% low <70.  Decrease basal to 1.4 from 4 to 7 PM.  Recommend to bolus 10 minutes before meals.  We discussed decreasing boluses for physical activity and snacks prior to physical activity.  Discussed Ozempic.  He will start 0.25 mg once weekly for 4 weeks, then increase if tolerating okay to 0.5 mg weekly.  Encouraged to schedule eye exam.  Diabetes will be reassessed in 3 months.

## 2023-08-15 ENCOUNTER — OFFICE VISIT (OUTPATIENT)
Dept: ENDOCRINOLOGY | Facility: CLINIC | Age: 63
End: 2023-08-15
Payer: MEDICARE

## 2023-08-15 VITALS
SYSTOLIC BLOOD PRESSURE: 112 MMHG | WEIGHT: 220 LBS | OXYGEN SATURATION: 97 % | BODY MASS INDEX: 31.5 KG/M2 | HEIGHT: 70 IN | DIASTOLIC BLOOD PRESSURE: 73 MMHG | HEART RATE: 71 BPM

## 2023-08-15 DIAGNOSIS — E10.65 TYPE 1 DIABETES MELLITUS WITH HYPERGLYCEMIA: Primary | ICD-10-CM

## 2023-08-15 DIAGNOSIS — E78.2 MIXED HYPERLIPIDEMIA: ICD-10-CM

## 2023-08-15 LAB
EXPIRATION DATE: ABNORMAL
EXPIRATION DATE: NORMAL
GLUCOSE BLDC GLUCOMTR-MCNC: 158 MG/DL (ref 70–130)
HBA1C MFR BLD: 8.3 %
Lab: ABNORMAL
Lab: NORMAL

## 2023-08-15 PROCEDURE — 3074F SYST BP LT 130 MM HG: CPT | Performed by: INTERNAL MEDICINE

## 2023-08-15 PROCEDURE — 3078F DIAST BP <80 MM HG: CPT | Performed by: INTERNAL MEDICINE

## 2023-08-15 PROCEDURE — 3052F HG A1C>EQUAL 8.0%<EQUAL 9.0%: CPT | Performed by: INTERNAL MEDICINE

## 2023-08-15 PROCEDURE — 83036 HEMOGLOBIN GLYCOSYLATED A1C: CPT | Performed by: INTERNAL MEDICINE

## 2023-08-15 PROCEDURE — 95251 CONT GLUC MNTR ANALYSIS I&R: CPT | Performed by: INTERNAL MEDICINE

## 2023-08-15 PROCEDURE — 99214 OFFICE O/P EST MOD 30 MIN: CPT | Performed by: INTERNAL MEDICINE

## 2023-08-15 RX ORDER — SEMAGLUTIDE 1.34 MG/ML
1 INJECTION, SOLUTION SUBCUTANEOUS
Qty: 3 ML | Refills: 5 | Status: SHIPPED | OUTPATIENT
Start: 2023-08-15

## 2023-08-15 RX ORDER — SEMAGLUTIDE 0.68 MG/ML
INJECTION, SOLUTION SUBCUTANEOUS
COMMUNITY
Start: 2023-08-10 | End: 2023-08-15

## 2023-08-15 NOTE — PROGRESS NOTES
Chief Complaint   Patient presents with    Diabetes          HPI   Edangela Oconnor is a 63 y.o. male had concerns including Diabetes.    He is checking blood sugars 4+ times per day with CGM.   Data reviewed from 8/1/2023 through 8/14/2023 shows an average glucose of 169, highest 400, lowest 40.  In target range 64% of the time, above target 34%, below target 1%.  CGM readings 285 times per day.  Pattern of postprandial hyperglycemia, sometimes delayed bolus with meals.    Current medications for diabetes include Ozempic 0.5 mg weekly (tolerating without issue and would consider dose increase).   Pump data reviewed over the last 2 weeks shows he is in control IQ 98% of the time, total daily dose of insulin 67.21 units, basal 52% or 35 units.    Weight is down 12 pounds since May.    The following portions of the patient's history were reviewed and updated as appropriate: allergies, current medications, past family history, past medical history, past social history, past surgical history, and problem list.      Review of Systems   Eyes:  Positive for visual disturbance (floaters yesterday).   Endocrine:        See HPI      Physical Exam  Vitals reviewed.   Constitutional:       Appearance: Normal appearance. He is obese.      Comments: Body mass index is 31.57 kg/mý.   Cardiovascular:      Rate and Rhythm: Normal rate.      Pulses:           Dorsalis pedis pulses are 2+ on the right side and 2+ on the left side.   Pulmonary:      Effort: Pulmonary effort is normal.   Feet:      Right foot:      Skin integrity: Skin integrity normal. Callus present.      Toenail Condition: Right toenails are normal.      Left foot:      Skin integrity: Skin integrity normal. Callus present.      Toenail Condition: Left toenails are normal.      Comments: Diabetic Foot Exam Performed and Monofilament Test Performed    Monofilament 5/5 bilaterally  Neurological:      General: No focal deficit present.      Mental Status: He is alert.  "Mental status is at baseline.   Psychiatric:         Mood and Affect: Mood normal.         Behavior: Behavior normal.      /73   Pulse 71   Ht 177.8 cm (70\")   Wt 99.8 kg (220 lb)   SpO2 97%   BMI 31.57 kg/mý      Labs and imaging    CMP:  Lab Results   Component Value Date    BUN 23 02/15/2023    CREATININE 1.18 02/15/2023    EGFRIFNONA 87 12/17/2021    BCR 19.5 02/15/2023     02/15/2023    K 4.9 02/15/2023    CO2 27.0 02/15/2023    CALCIUM 9.6 02/15/2023    ALBUMIN 4.5 02/15/2023    BILITOT 0.5 02/15/2023    ALKPHOS 90 02/15/2023    AST 19 02/15/2023    ALT 16 02/15/2023     Lipid Panel:  Lab Results   Component Value Date    CHOL 127 02/15/2023    TRIG 100 02/15/2023    HDL 46 02/15/2023    VLDL 19 02/15/2023    LDL 62 02/15/2023     HbA1c:  Lab Results   Component Value Date    HGBA1C 8.3 08/15/2023    HGBA1C 8.1 05/26/2023     Glucose:    Lab Results   Component Value Date    POCGLU 158 (A) 08/15/2023     Microalbumin:  Lab Results   Component Value Date    MALBCRERATIO 11.5 02/15/2023     TSH:  Lab Results   Component Value Date    TSH 3.100 02/15/2023       Assessment and plan  Diagnoses and all orders for this visit:    1. Type 1 diabetes mellitus with hyperglycemia (Primary)  Uncontrolled with postprandial hyperglycemia.  Much of the time due to delayed bolus with meals.  A1c up to 8.3.  Complicated by history of retinopathy.   Increase Ozempic to 1 mg weekly.  Titrate up to max dose as tolerated/if covered.  Discussed again that this is off label use.  Insurance covering at this time.  No changes were made to the pump settings today.  Bolus at least 10 to 15 minutes prior to meals.  Administer partial bolus if unsure how much he will consume.  Monofilament updated today.  Ophtho exam is due and patient was encouraged to schedule.  Requested the report be sent here.  Labs are up-to-date from February.  -     POC Glucose, Blood  -     POC Glycosylated Hemoglobin (Hb A1C)  -     Semaglutide, " 1 MG/DOSE, (Ozempic, 1 MG/DOSE,) 4 MG/3ML solution pen-injector; Inject 1 mg under the skin into the appropriate area as directed Every 7 (Seven) Days.  Dispense: 3 mL; Refill: 5    2. Mixed hyperlipidemia  Lipids at goal on rosuvastatin 10 mg daily.  Monitor yearly.       Return in about 3 months (around 11/15/2023) for next scheduled follow up. The patient was instructed to contact the clinic with any interval questions or concerns.    Eleonora Wright, DO   Endocrinologist    Please note that portions of this note were completed with a voice recognition program.

## 2023-11-16 ENCOUNTER — OFFICE VISIT (OUTPATIENT)
Dept: ENDOCRINOLOGY | Facility: CLINIC | Age: 63
End: 2023-11-16
Payer: MEDICARE

## 2023-11-16 VITALS
OXYGEN SATURATION: 99 % | HEIGHT: 70 IN | WEIGHT: 221 LBS | BODY MASS INDEX: 31.64 KG/M2 | HEART RATE: 73 BPM | DIASTOLIC BLOOD PRESSURE: 77 MMHG | SYSTOLIC BLOOD PRESSURE: 122 MMHG

## 2023-11-16 DIAGNOSIS — E03.9 ACQUIRED HYPOTHYROIDISM: ICD-10-CM

## 2023-11-16 DIAGNOSIS — Z46.81 INSULIN PUMP TITRATION: ICD-10-CM

## 2023-11-16 DIAGNOSIS — E10.65 TYPE 1 DIABETES MELLITUS WITH HYPERGLYCEMIA: Primary | ICD-10-CM

## 2023-11-16 DIAGNOSIS — E78.2 MIXED HYPERLIPIDEMIA: ICD-10-CM

## 2023-11-16 LAB
EXPIRATION DATE: ABNORMAL
EXPIRATION DATE: ABNORMAL
GLUCOSE BLDC GLUCOMTR-MCNC: 175 MG/DL (ref 70–130)
HBA1C MFR BLD: 7.7 % (ref 4.5–5.7)
Lab: ABNORMAL
Lab: ABNORMAL

## 2023-11-16 PROCEDURE — 3051F HG A1C>EQUAL 7.0%<8.0%: CPT | Performed by: INTERNAL MEDICINE

## 2023-11-16 PROCEDURE — 3074F SYST BP LT 130 MM HG: CPT | Performed by: INTERNAL MEDICINE

## 2023-11-16 PROCEDURE — 3078F DIAST BP <80 MM HG: CPT | Performed by: INTERNAL MEDICINE

## 2023-11-16 PROCEDURE — 83036 HEMOGLOBIN GLYCOSYLATED A1C: CPT | Performed by: INTERNAL MEDICINE

## 2023-11-16 PROCEDURE — 95251 CONT GLUC MNTR ANALYSIS I&R: CPT | Performed by: INTERNAL MEDICINE

## 2023-11-16 PROCEDURE — 99214 OFFICE O/P EST MOD 30 MIN: CPT | Performed by: INTERNAL MEDICINE

## 2023-11-16 RX ORDER — LEVOTHYROXINE SODIUM 0.05 MG/1
50 TABLET ORAL DAILY
Qty: 90 TABLET | Refills: 3 | Status: SHIPPED | OUTPATIENT
Start: 2023-11-16

## 2023-11-16 RX ORDER — INSULIN ASPART 100 [IU]/ML
INJECTION, SOLUTION INTRAVENOUS; SUBCUTANEOUS
Qty: 90 ML | Refills: 3 | Status: SHIPPED | OUTPATIENT
Start: 2023-11-16

## 2023-11-16 NOTE — PROGRESS NOTES
"Chief Complaint   Patient presents with    Diabetes          HPI   Alfredo Oconnor is a 63 y.o. male had concerns including Diabetes.    Diabetes diagnosed 1990.  He is checking blood sugars 4+ times per day with CGM. Data reviewed from the last two weeks shows average glucose 176 with standard deviation of 56. In target range 61%, high 29%, very high 9%, low 0%.    Pattern of: Hyperglycemia overnight, sometimes after meals, takes a long time for BG's to correct.    Patient decreased basal rates due to nocturnal hypoglycemia.  This was not noted on his prior download.    Current medications for diabetes include Ozempic 1 mg weekly, insulin pump.    Pump data reviewed from 11/3/2023 through 11/16/2023 shows he is in control IQ 96% of the time.  Average daily dose of insulin 75.06 units, basal 54% or 40.64 units.    Had some tolerability issues on the higher dose of Ozempic but has resumed and seems to be doing okay.    He is on levothyroxine 50 mcg daily.  Labs are up-to-date from February.    Is overdue for an ophtho exam.    The following portions of the patient's history were reviewed and updated as appropriate: allergies, current medications, past family history, past medical history, past social history, past surgical history, and problem list.      Review of Systems   Constitutional: Negative.    Endocrine:        See HPI        Physical Exam  Vitals reviewed.   Constitutional:       Appearance: Normal appearance.   Cardiovascular:      Rate and Rhythm: Normal rate.   Pulmonary:      Effort: Pulmonary effort is normal.   Neurological:      General: No focal deficit present.      Mental Status: He is alert. Mental status is at baseline.   Psychiatric:         Mood and Affect: Mood normal.         Behavior: Behavior normal.        /77   Pulse 73   Ht 177.8 cm (70\")   Wt 100 kg (221 lb)   SpO2 99%   BMI 31.71 kg/m²      Labs and imaging    CMP:  Lab Results   Component Value Date    BUN 23 02/15/2023    " CREATININE 1.18 02/15/2023    EGFR 69.8 02/15/2023    BCR 19.5 02/15/2023     02/15/2023    K 4.9 02/15/2023    CO2 27.0 02/15/2023    CALCIUM 9.6 02/15/2023    ALBUMIN 4.5 02/15/2023    BILITOT 0.5 02/15/2023    ALKPHOS 90 02/15/2023    AST 19 02/15/2023    ALT 16 02/15/2023     Lipid Panel:  Lab Results   Component Value Date    CHOL 127 02/15/2023    TRIG 100 02/15/2023    HDL 46 02/15/2023    VLDL 19 02/15/2023    LDL 62 02/15/2023     HbA1c:  Lab Results   Component Value Date    HGBA1C 7.7 (A) 11/16/2023    HGBA1C 8.3 08/15/2023     Glucose:    Lab Results   Component Value Date    POCGLU 175 (A) 11/16/2023     Microalbumin:  Lab Results   Component Value Date    MALBCRERATIO 11.5 02/15/2023     TSH:  Lab Results   Component Value Date    TSH 3.100 02/15/2023       Assessment and plan  Diagnoses and all orders for this visit:    1. Type 1 diabetes mellitus with hyperglycemia (Primary)  Diagnosed 1990.  Complicated by history of retinopathy.  Uncontrolled with hyperglycemia.  A1c improved to 7.7.  Changes were made to the pump as noted below.    Labs are up-to-date from February.  Monofilament up-to-date from August 2023.  Ophtho exam is overdue and patient was encouraged to schedule and have the report sent here.  Discouraged home adjustments to his settings without consulting our office.  -     POC Glucose, Blood  -     POC Glycosylated Hemoglobin (Hb A1C)  -     Insulin Aspart (NovoLOG) 100 UNIT/ML injection; MAX 90 UNITS DAILY IN INSULIN PUMP  Dispense: 90 mL; Refill: 3    2. Insulin pump titration  Increase basal rate between midnight to 4 AM to 1.35 units/h, basal rate increased at 10 PM to 1.65 units/h.    Out of control IQ changed his target glucose all to 120.  Correction factor between midnight to 7:30 AM changed to 45, between 7:30 AM to 4 PM set to 40.  4 PM to midnight changed to 35 to administer more insulin with high sugars.    3. Mixed hyperlipidemia  Lipids up-to-date from February.  LDL  at goal on rosuvastatin 10 mg daily.  Monitor yearly.    4. Acquired hypothyroidism  Clinically and biochemically euthyroid on levothyroxine 50 mcg daily.  TSH in a normal range from February.  Monitor yearly.  Refill sent to the pharmacy.  -     levothyroxine (SYNTHROID, LEVOTHROID) 50 MCG tablet; Take 1 tablet by mouth Daily.  Dispense: 90 tablet; Refill: 3         Return in about 4 months (around 3/16/2024) for next scheduled follow up. The patient was instructed to contact the clinic with any interval questions or concerns.    Eleonora Wright, DO   Endocrinologist    Please note that portions of this note were completed with a voice recognition program.

## 2023-12-05 ENCOUNTER — TELEPHONE (OUTPATIENT)
Dept: ENDOCRINOLOGY | Facility: CLINIC | Age: 63
End: 2023-12-05

## 2023-12-05 ENCOUNTER — TELEPHONE (OUTPATIENT)
Dept: ENDOCRINOLOGY | Facility: CLINIC | Age: 63
End: 2023-12-05
Payer: MEDICARE

## 2023-12-05 NOTE — TELEPHONE ENCOUNTER
Caller: Alfredo Oconnor    Relationship to patient: Self    Best call back number: [phone]     Patient is needing: PATIENT STATES THAT [facility] ONLY RECEIVED THE COVER PAGE OF THE PROGRESS NOTE REQUEST. WOULD YOU PLEASE REFAX THE ENTIRE DOCUMENT? THANK YOU!    FAX:[phone]    ENDOCRINOLOGY - SCAN - [facility] PROGRESS NOTE REQUEST 12/01/23 (12/01/2023)

## 2023-12-05 NOTE — TELEPHONE ENCOUNTER
PT CALLED STATING HE IS TO HAVE A COLONOSCOPY TOMORROW. HE REQUESTED TO KNOW WHAT TO DO WITH HIS PUMP.

## 2023-12-05 NOTE — TELEPHONE ENCOUNTER
Returned call, advised pt to continue using CIQ during the procedure. He expressed concern that there may not be a staff member present that will know how to respond if there is a glucose alarm during the procedure. Advised him to call the office for his provider and ask what their protocol is and how long he can anticipate the procedure being. Advised he can have them call our office if they need further clarification.  Pt will call back with any issues.

## 2024-01-24 ENCOUNTER — TELEPHONE (OUTPATIENT)
Dept: ENDOCRINOLOGY | Facility: CLINIC | Age: 64
End: 2024-01-24
Payer: MEDICARE

## 2024-01-24 DIAGNOSIS — E10.65 TYPE 1 DIABETES MELLITUS WITH HYPERGLYCEMIA: Primary | Chronic | ICD-10-CM

## 2024-01-24 NOTE — TELEPHONE ENCOUNTER
We have paperwork from [employer] but patient is needing Cpep and fasting glucose.  You are listed on the [employer] form.  Can you enter orders?  He would like to have them done at [facility].

## 2024-01-28 DIAGNOSIS — E10.65 TYPE 1 DIABETES MELLITUS WITH HYPERGLYCEMIA: ICD-10-CM

## 2024-01-29 RX ORDER — SEMAGLUTIDE 1.34 MG/ML
INJECTION, SOLUTION SUBCUTANEOUS
Qty: 3 ML | Refills: 0 | Status: SHIPPED | OUTPATIENT
Start: 2024-01-29

## 2024-01-29 NOTE — TELEPHONE ENCOUNTER
Rx Refill Note  Requested Prescriptions     Pending Prescriptions Disp Refills    Ozempic, 1 MG/DOSE, 4 MG/3ML solution pen-injector [Pharmacy Med Name: Ozempic (1 MG/DOSE) 4 MG/3ML Subcutaneous Solution Pen-injector] 3 mL 0     Sig: INJECT 1MG UNDER THE SKIN INTO THE APPROPRIATE AREA ONCE EVERY 7 DAYS      Last office visit with prescribing clinician: 11/16/2023     Next office visit with prescribing clinician: 2/16/24        Hamilton Bass MA  01/29/24, 13:11 EST

## 2024-02-16 ENCOUNTER — TELEPHONE (OUTPATIENT)
Dept: ENDOCRINOLOGY | Facility: CLINIC | Age: 64
End: 2024-02-16

## 2024-02-16 ENCOUNTER — OFFICE VISIT (OUTPATIENT)
Dept: ENDOCRINOLOGY | Facility: CLINIC | Age: 64
End: 2024-02-16
Payer: MEDICARE

## 2024-02-16 VITALS — HEART RATE: 71 BPM | BODY MASS INDEX: 32.35 KG/M2 | WEIGHT: 226 LBS | OXYGEN SATURATION: 98 % | HEIGHT: 70 IN

## 2024-02-16 DIAGNOSIS — E10.65 TYPE 1 DIABETES MELLITUS WITH HYPERGLYCEMIA: Primary | ICD-10-CM

## 2024-02-16 LAB
EXPIRATION DATE: ABNORMAL
GLUCOSE BLDC GLUCOMTR-MCNC: 148 MG/DL (ref 70–130)
Lab: ABNORMAL

## 2024-02-16 PROCEDURE — 99213 OFFICE O/P EST LOW 20 MIN: CPT | Performed by: PHYSICIAN ASSISTANT

## 2024-02-16 PROCEDURE — 82947 ASSAY GLUCOSE BLOOD QUANT: CPT | Performed by: PHYSICIAN ASSISTANT

## 2024-02-16 PROCEDURE — 36415 COLL VENOUS BLD VENIPUNCTURE: CPT | Performed by: PHYSICIAN ASSISTANT

## 2024-02-16 NOTE — TELEPHONE ENCOUNTER
PATIENT WOULD LIKE A CALL BACK TO DISCUSS LETTER WITH SIGNATURE THAT CHANTALE IS NEEDING FROM US STATING HE HAD FASTING LABS AND C-PEPTIDE LABS DRAWN IN ORDER TO GET NEW INSULIN PUMP. PHONE NUMBER [phone]

## 2024-02-19 ENCOUNTER — TELEPHONE (OUTPATIENT)
Dept: ENDOCRINOLOGY | Facility: CLINIC | Age: 64
End: 2024-02-19
Payer: MEDICARE

## 2024-02-19 NOTE — TELEPHONE ENCOUNTER
Patient called stated he spoke with [facility] and they are going to be sending us a new order for his pump because it has now been over 90 days. He just wanted to give someone a heads up about this. Please advise.

## 2024-02-20 ENCOUNTER — TELEPHONE (OUTPATIENT)
Dept: ENDOCRINOLOGY | Facility: CLINIC | Age: 64
End: 2024-02-20
Payer: MEDICARE

## 2024-02-20 DIAGNOSIS — E10.65 TYPE 1 DIABETES MELLITUS WITH HYPERGLYCEMIA: ICD-10-CM

## 2024-02-20 NOTE — TELEPHONE ENCOUNTER
Please let him know that the labs we received from [facility] done on 1/25/24 showed that his kidney function had declined. He will need to come by and repeat the labs I had ordered at his appointment last week so we can see if his kidney function is still abnormal. I thought he was going to come by this week to do fasting labs so hopefully he can do that. He should make sure he is drinking plenty of water to help his kidneys.

## 2024-02-20 NOTE — TELEPHONE ENCOUNTER
Spoke with pts wife - read her the message .  He will come in Thursday a little before 9am to get labs.  (I let kassandra know he would be earlier than 9am)

## 2024-02-23 ENCOUNTER — LAB (OUTPATIENT)
Dept: LAB | Facility: HOSPITAL | Age: 64
End: 2024-02-23
Payer: MEDICARE

## 2024-02-23 DIAGNOSIS — E10.65 TYPE 1 DIABETES MELLITUS WITH HYPERGLYCEMIA: ICD-10-CM

## 2024-02-23 LAB
ALBUMIN SERPL-MCNC: 4.2 G/DL (ref 3.5–5.2)
ALBUMIN UR-MCNC: 2.2 MG/DL
ALBUMIN/GLOB SERPL: 1.6 G/DL
ALP SERPL-CCNC: 89 U/L (ref 39–117)
ALT SERPL W P-5'-P-CCNC: 20 U/L (ref 1–41)
ANION GAP SERPL CALCULATED.3IONS-SCNC: 10.9 MMOL/L (ref 5–15)
AST SERPL-CCNC: 24 U/L (ref 1–40)
BILIRUB SERPL-MCNC: 0.5 MG/DL (ref 0–1.2)
BUN SERPL-MCNC: 22 MG/DL (ref 8–23)
BUN/CREAT SERPL: 19.1 (ref 7–25)
CALCIUM SPEC-SCNC: 9 MG/DL (ref 8.6–10.5)
CHLORIDE SERPL-SCNC: 101 MMOL/L (ref 98–107)
CHOLEST SERPL-MCNC: 113 MG/DL (ref 0–200)
CO2 SERPL-SCNC: 25.1 MMOL/L (ref 22–29)
CREAT SERPL-MCNC: 1.15 MG/DL (ref 0.76–1.27)
CREAT UR-MCNC: 286.2 MG/DL
EGFRCR SERPLBLD CKD-EPI 2021: 71.5 ML/MIN/1.73
GLOBULIN UR ELPH-MCNC: 2.7 GM/DL
GLUCOSE SERPL-MCNC: 205 MG/DL (ref 65–99)
HDLC SERPL-MCNC: 39 MG/DL (ref 40–60)
LDLC SERPL CALC-MCNC: 49 MG/DL (ref 0–100)
LDLC/HDLC SERPL: 1.15 {RATIO}
MICROALBUMIN/CREAT UR: 7.7 MG/G (ref 0–29)
POTASSIUM SERPL-SCNC: 4 MMOL/L (ref 3.5–5.2)
PROT SERPL-MCNC: 6.9 G/DL (ref 6–8.5)
SODIUM SERPL-SCNC: 137 MMOL/L (ref 136–145)
TRIGL SERPL-MCNC: 146 MG/DL (ref 0–150)
TSH SERPL DL<=0.05 MIU/L-ACNC: 2.36 UIU/ML (ref 0.27–4.2)
VLDLC SERPL-MCNC: 25 MG/DL (ref 5–40)

## 2024-02-23 PROCEDURE — 80061 LIPID PANEL: CPT

## 2024-02-23 PROCEDURE — 86341 ISLET CELL ANTIBODY: CPT

## 2024-02-23 PROCEDURE — 82570 ASSAY OF URINE CREATININE: CPT | Performed by: PHYSICIAN ASSISTANT

## 2024-02-23 PROCEDURE — 82043 UR ALBUMIN QUANTITATIVE: CPT | Performed by: PHYSICIAN ASSISTANT

## 2024-02-23 PROCEDURE — 84443 ASSAY THYROID STIM HORMONE: CPT

## 2024-02-23 PROCEDURE — 84681 ASSAY OF C-PEPTIDE: CPT | Performed by: PHYSICIAN ASSISTANT

## 2024-02-23 PROCEDURE — 80053 COMPREHEN METABOLIC PANEL: CPT

## 2024-02-24 LAB — C PEPTIDE SERPL-MCNC: <0.1 NG/ML (ref 1.1–4.4)

## 2024-02-25 NOTE — PROGRESS NOTES
Your labs show that your kidney function, liver function and electrolytes look fine.    Your cholesterol is well controlled with your current medication.    Your thyroid levels are within normal limits.    We are still waiting on the islet cell antibody to be resulted.

## 2024-02-25 NOTE — PROGRESS NOTES
Your C-peptide level is low, indicating the need for insulin.    Your urine sample does not show a significant amount of protein. We will continue to monitor this yearly.

## 2024-02-27 LAB — PANC ISLET CELL AB TITR SER: NEGATIVE {TITER}

## 2024-03-01 NOTE — PROGRESS NOTES
Please send patient's recent labs (cmp, islet cell antibody, c-peptide)  to [facility] to make sure they have everything they need, so he can get his new pump. There is paperwork from them under the media tab. Thank you

## 2024-04-09 DIAGNOSIS — E10.65 TYPE 1 DIABETES MELLITUS WITH HYPERGLYCEMIA: ICD-10-CM

## 2024-04-09 RX ORDER — INSULIN ASPART 100 [IU]/ML
INJECTION, SOLUTION INTRAVENOUS; SUBCUTANEOUS
Qty: 90 ML | Refills: 0 | Status: SHIPPED | OUTPATIENT
Start: 2024-04-09

## 2024-04-09 NOTE — TELEPHONE ENCOUNTER
Pharmacy request brand novolog - cheaper for ptRx Refill Note  Requested Prescriptions     Pending Prescriptions Disp Refills    NovoLOG 100 UNIT/ML injection 90 mL 3     Sig: MAX 90 UNITS DAILY IN INSULIN PUMP        Last office visit with prescribing clinician: 2/16/2024      Next office visit with prescribing clinician: 5/14/2024       Wendy Thomas (Jodi)  04/09/24, 10:27 EDT

## 2024-05-14 ENCOUNTER — OFFICE VISIT (OUTPATIENT)
Dept: ENDOCRINOLOGY | Facility: CLINIC | Age: 64
End: 2024-05-14
Payer: MEDICARE

## 2024-05-14 VITALS
OXYGEN SATURATION: 97 % | DIASTOLIC BLOOD PRESSURE: 66 MMHG | HEIGHT: 70 IN | WEIGHT: 230 LBS | SYSTOLIC BLOOD PRESSURE: 140 MMHG | BODY MASS INDEX: 32.93 KG/M2 | HEART RATE: 54 BPM

## 2024-05-14 DIAGNOSIS — E10.65 TYPE 1 DIABETES MELLITUS WITH HYPERGLYCEMIA: Primary | ICD-10-CM

## 2024-05-14 LAB
EXPIRATION DATE: ABNORMAL
EXPIRATION DATE: ABNORMAL
GLUCOSE BLDC GLUCOMTR-MCNC: 154 MG/DL (ref 70–130)
HBA1C MFR BLD: 8.5 % (ref 4.5–5.7)
Lab: ABNORMAL
Lab: ABNORMAL

## 2024-05-14 PROCEDURE — 1160F RVW MEDS BY RX/DR IN RCRD: CPT | Performed by: PHYSICIAN ASSISTANT

## 2024-05-14 PROCEDURE — 3052F HG A1C>EQUAL 8.0%<EQUAL 9.0%: CPT | Performed by: PHYSICIAN ASSISTANT

## 2024-05-14 PROCEDURE — 3077F SYST BP >= 140 MM HG: CPT | Performed by: PHYSICIAN ASSISTANT

## 2024-05-14 PROCEDURE — 82947 ASSAY GLUCOSE BLOOD QUANT: CPT | Performed by: PHYSICIAN ASSISTANT

## 2024-05-14 PROCEDURE — 83036 HEMOGLOBIN GLYCOSYLATED A1C: CPT | Performed by: PHYSICIAN ASSISTANT

## 2024-05-14 PROCEDURE — 1159F MED LIST DOCD IN RCRD: CPT | Performed by: PHYSICIAN ASSISTANT

## 2024-05-14 PROCEDURE — 99214 OFFICE O/P EST MOD 30 MIN: CPT | Performed by: PHYSICIAN ASSISTANT

## 2024-05-14 PROCEDURE — 3078F DIAST BP <80 MM HG: CPT | Performed by: PHYSICIAN ASSISTANT

## 2024-05-14 NOTE — PROGRESS NOTES
Office Note      Date: 2024  Patient Name: Alfredo Oconnor  MRN: [id number]  : 1960    Chief Complaint   Patient presents with    Diabetes       History of Present Illness:   Alfredo Oconnor is a 64 y.o. male who presents for Diabetes type 1.   Current RX: Novolog via Tandem pump    Bg checks are done:continuously with Dexcom  Hypoglycemia : none    Pt stopped his Ozempic in April when his labs showed some abnormalities in kidneys. Wife was concerned so she advised him to stop it.  He has not taken it for about 2 months.    He notes that he has not had any lows. Sometimes the pump is not bringing BG down overnight and does not seem to respond as well as it used to when he enters carbs for meals.    Last A1c:  Hemoglobin A1C   Date Value Ref Range Status   2024 8.5 (A) 4.5 - 5.7 % Final     The last 2 weeks of CGM data are reviewed.  0.2 % are low  54 % are in range  41 % are 180-250  4.5 % are >250  GMI: 7.6  The glycemic pattern shows: overnight glucose at high end of target range; trending up throughout the day    Changes in health since last visit: Had decline in renal function on labs that resolved with recheck.     DM Health Maintenance:  Ophtho: 3/22/24  Monofilament / Foot exam: 8/15/23  Lipids/Statin: taking a statin with last FLP showing LDL 2/15/24 49  SAGRARIO: 2/15/24  TSH: 2/15/24  Aspirin: taking  ACE/ARB: not indicated       Subjective      Diabetic Complications:  Eyes: Yes, describe: mild NMDR  Kidneys: No  Feet: No  Heart: No        Review of Systems:   Review of Systems   Constitutional:  Negative for activity change, appetite change and fatigue.   Respiratory:  Negative for chest tightness and shortness of breath.    Gastrointestinal:  Negative for constipation, diarrhea and nausea.   Musculoskeletal:  Negative for arthralgias and myalgias.   Neurological:  Negative for weakness.   Psychiatric/Behavioral:  Negative for dysphoric mood. The patient is not nervous/anxious.   "      The following portions of the patient's history were reviewed and updated as appropriate: allergies, current medications, past family history, past medical history, past social history, past surgical history, and problem list.    Objective     Visit Vitals  /66   Pulse 54   Ht 177.8 cm (70\")   Wt 104 kg (230 lb)   SpO2 97%   BMI 33.00 kg/m²           Physical Exam:  Physical Exam  Constitutional:       Appearance: He is well-developed.   HENT:      Head: Normocephalic and atraumatic.      Right Ear: External ear normal.      Left Ear: External ear normal.   Eyes:      Conjunctiva/sclera: Conjunctivae normal.   Cardiovascular:      Rate and Rhythm: Normal rate and regular rhythm.   Pulmonary:      Effort: Pulmonary effort is normal.      Breath sounds: Normal breath sounds.   Musculoskeletal:         General: Normal range of motion.      Cervical back: Normal range of motion.   Skin:     General: Skin is warm and dry.   Psychiatric:         Behavior: Behavior normal.          Assessment / Plan      Assessment & Plan:  Diagnoses and all orders for this visit:    1. Type 1 diabetes mellitus with hyperglycemia (Primary)  Assessment & Plan:  Diabetes is worsening.   Medication changes per orders.  Reminded to bring in blood sugar diary at next visit.  Recommended an ADA diet.  Regular aerobic exercise.    Restart Ozempic.  Gave patient sample pen so he can start 0.25 mg and titrate up to 0.5 mg after 4 weeks.  He will then restart the 1 mg pen he has at home if the lower doses are well-tolerated.  Ordered CMP so he can stop by for recheck of renal function after 3 to 4 weeks of Ozempic.  We can also check after he increases to each dose to ensure renal function is stable.    Restarting Ozempic should help with insulin resistance and likely current pump settings will work more efficiently.    Diabetes will be reassessed in 3 months    Orders:  -     POC Glucose, Blood  -     POC Glycosylated Hemoglobin (Hb " A1C)  -     Comprehensive Metabolic Panel; Future        Return in about 3 months (around 8/14/2024) for Follow up with me, Dr. Wright in 6 months.    Portions of this note were completed with voice recognition program.  Electronically signed by Rehana Mcgee PA-C  [facility] [place]  05/14/2024

## 2024-05-14 NOTE — ASSESSMENT & PLAN NOTE
Diabetes is worsening.   Medication changes per orders.  Reminded to bring in blood sugar diary at next visit.  Recommended an ADA diet.  Regular aerobic exercise.    Restart Ozempic.  Gave patient sample pen so he can start 0.25 mg and titrate up to 0.5 mg after 4 weeks.  He will then restart the 1 mg pen he has at home if the lower doses are well-tolerated.  Ordered CMP so he can stop by for recheck of renal function after 3 to 4 weeks of Ozempic.  We can also check after he increases to each dose to ensure renal function is stable.    Restarting Ozempic should help with insulin resistance and likely current pump settings will work more efficiently.    Diabetes will be reassessed in 3 months

## 2024-07-09 DIAGNOSIS — E10.65 TYPE 1 DIABETES MELLITUS WITH HYPERGLYCEMIA: ICD-10-CM

## 2024-07-09 RX ORDER — SEMAGLUTIDE 1.34 MG/ML
INJECTION, SOLUTION SUBCUTANEOUS
Qty: 3 ML | Refills: 5 | Status: SHIPPED | OUTPATIENT
Start: 2024-07-09

## 2024-07-09 NOTE — TELEPHONE ENCOUNTER
Rx Refill Note  Requested Prescriptions     Pending Prescriptions Disp Refills    Ozempic, 1 MG/DOSE, 4 MG/3ML solution pen-injector [Pharmacy Med Name: Ozempic (1 MG/DOSE) 4 MG/3ML Subcutaneous Solution Pen-injector] 3 mL 0     Sig: INJECT 1 MG UNDER THE SKIN INTO THE APPROPRIATE AREA ONCE EVERY 7 DAYS      Last office visit with prescribing clinician: 5/14/2024   Last telemedicine visit with prescribing clinician: Visit date not found   Next office visit with prescribing clinician: 8/14/2024                         Would you like a call back once the refill request has been completed: [] Yes [] No    If the office needs to give you a call back, can they leave a voicemail: [] Yes [] No    Dangelo Enrique MA  07/09/24, 10:05 EDT

## 2024-07-17 DIAGNOSIS — E10.65 TYPE 1 DIABETES MELLITUS WITH HYPERGLYCEMIA: ICD-10-CM

## 2024-07-17 RX ORDER — INSULIN ASPART 100 [IU]/ML
INJECTION, SOLUTION INTRAVENOUS; SUBCUTANEOUS
Qty: 90 ML | Refills: 1 | Status: SHIPPED | OUTPATIENT
Start: 2024-07-17

## 2024-07-17 NOTE — TELEPHONE ENCOUNTER
Caller: Alfredo Oconnor    Relationship: Self    Best call back number:     [phone]       Requested Prescriptions:   Requested Prescriptions     Pending Prescriptions Disp Refills    NovoLOG 100 UNIT/ML injection 90 mL 0     Sig: MAX 90 UNITS DAILY IN INSULIN PUMP        Pharmacy where request should be sent:    [facility] [address] [place] [address] - [address] [place] [address] - [phone]  - [phone]    [address] [facility] [address]   Phone: [phone] Fax: [phone]   Hours: Not open 24 hours     Last office visit with prescribing clinician: 5/14/2024   Last telemedicine visit with prescribing clinician: Visit date not found   Next office visit with prescribing clinician: 8/14/2024     Additional details provided by patient: PT IS NEEDING A PRESCRIPTION SENT TO PHARMACY FOR MEDICATION.     Does the patient have less than a 3 day supply:  [x] Yes  [] No    Would you like a call back once the refill request has been completed: [x] Yes [] No    If the office needs to give you a call back, can they leave a voicemail: [x] Yes [] No    Rhys Bass   07/17/24 14:31 EDT

## 2024-08-14 ENCOUNTER — OFFICE VISIT (OUTPATIENT)
Dept: ENDOCRINOLOGY | Facility: CLINIC | Age: 64
End: 2024-08-14
Payer: MEDICARE

## 2024-08-14 VITALS
WEIGHT: 220 LBS | HEART RATE: 77 BPM | SYSTOLIC BLOOD PRESSURE: 120 MMHG | DIASTOLIC BLOOD PRESSURE: 66 MMHG | HEIGHT: 70 IN | OXYGEN SATURATION: 99 % | BODY MASS INDEX: 31.5 KG/M2

## 2024-08-14 DIAGNOSIS — E10.65 TYPE 1 DIABETES MELLITUS WITH HYPERGLYCEMIA: Primary | ICD-10-CM

## 2024-08-14 LAB
EXPIRATION DATE: ABNORMAL
EXPIRATION DATE: ABNORMAL
GLUCOSE BLDC GLUCOMTR-MCNC: 301 MG/DL (ref 70–130)
HBA1C MFR BLD: 8.4 % (ref 4.5–5.7)
Lab: ABNORMAL
Lab: ABNORMAL

## 2024-08-14 PROCEDURE — 3078F DIAST BP <80 MM HG: CPT | Performed by: PHYSICIAN ASSISTANT

## 2024-08-14 PROCEDURE — 1159F MED LIST DOCD IN RCRD: CPT | Performed by: PHYSICIAN ASSISTANT

## 2024-08-14 PROCEDURE — 3052F HG A1C>EQUAL 8.0%<EQUAL 9.0%: CPT | Performed by: PHYSICIAN ASSISTANT

## 2024-08-14 PROCEDURE — 1160F RVW MEDS BY RX/DR IN RCRD: CPT | Performed by: PHYSICIAN ASSISTANT

## 2024-08-14 PROCEDURE — 95251 CONT GLUC MNTR ANALYSIS I&R: CPT | Performed by: PHYSICIAN ASSISTANT

## 2024-08-14 PROCEDURE — 3074F SYST BP LT 130 MM HG: CPT | Performed by: PHYSICIAN ASSISTANT

## 2024-08-14 PROCEDURE — 83036 HEMOGLOBIN GLYCOSYLATED A1C: CPT | Performed by: PHYSICIAN ASSISTANT

## 2024-08-14 PROCEDURE — 99214 OFFICE O/P EST MOD 30 MIN: CPT | Performed by: PHYSICIAN ASSISTANT

## 2024-08-14 NOTE — PROGRESS NOTES
Office Note      Date: 2024  Patient Name: Alfredo Oconnor  MRN: [id number]  : 1960    Chief Complaint   Patient presents with    Diabetes     Type 1 diabetes mellitus with hyperglycemia         History of Present Illness:   Alfredo Oconnor is a 64 y.o. male who presents for Diabetes type 1.   Diagnosed:   Current RX: Ozempic 1 mg weekly, Novolog via Tandem pump    Bg checks are done:continuously with Dexcom  Hypoglycemia :frequent    Pt has restarted Ozempic, just switched to 1 mg and since then has been struggling with postprandial hypoglycemia after his evening meal.  He has been entering reduced carb amounts to compensate for this.  The hypoglycemia seems to linger despite ingesting carbs to bring it up.  He will then have a delayed hyperglycemia towards the morning.  Otherwise he is tolerating Ozempic without any problem.    Last A1c:  Hemoglobin A1C   Date Value Ref Range Status   2024 8.4 (A) 4.5 - 5.7 % Final     The last 2 weeks of CGM data are reviewed.  0 % are low  54 % are in range  35 % are 180-250  11 % are >250  GMI: 7.6  The glycemic pattern shows: overnight glucose in target range, trending up from wake up to early afternoon; averages show hyperglycemia after dinner but daily report shows recent hypoglycemia after dinner    Changes in health since last visit: none.     DM Health Maintenance:  Ophtho: 3/22/24  Monofilament / Foot exam: 8/15/23  Lipids/Statin: taking a statin with last FLP showing LDL 2/15/24 49  SAGRARIO: 2/15/24  TSH: 2/15/24  Aspirin: taking  ACE/ARB: not indicated       Subjective     Diabetic Complications:  Eyes: Yes, describe: mild NMDR  Kidneys: No  Feet: No  Heart: No       Review of Systems:   Review of Systems   Constitutional:  Negative for activity change, appetite change and fatigue.   Respiratory:  Negative for chest tightness and shortness of breath.    Musculoskeletal:  Negative for myalgias.   Psychiatric/Behavioral:  The patient is not  "nervous/anxious.        The following portions of the patient's history were reviewed and updated as appropriate: allergies, current medications, past family history, past medical history, past social history, past surgical history, and problem list.    Objective     Visit Vitals  /66 (BP Location: Left arm, Patient Position: Sitting, Cuff Size: Adult)   Pulse 77   Ht 177.8 cm (70\")   Wt 99.8 kg (220 lb)   SpO2 99%   BMI 31.57 kg/m²           Physical Exam:  Physical Exam  Constitutional:       Appearance: He is well-developed.   HENT:      Head: Normocephalic and atraumatic.      Right Ear: External ear normal.      Left Ear: External ear normal.   Eyes:      Conjunctiva/sclera: Conjunctivae normal.   Cardiovascular:      Rate and Rhythm: Normal rate and regular rhythm.   Pulmonary:      Effort: Pulmonary effort is normal.      Breath sounds: Normal breath sounds.   Musculoskeletal:         General: Normal range of motion.      Cervical back: Normal range of motion.   Skin:     General: Skin is warm and dry.   Psychiatric:         Behavior: Behavior normal.        Assessment / Plan      Assessment & Plan:  Diagnoses and all orders for this visit:    1. Type 1 diabetes mellitus with hyperglycemia (Primary)  Assessment & Plan:  Diabetes is improving with treatment.   Medication changes per orders.  Recommended an ADA diet.  Regular aerobic exercise.  Discussed ways to avoid symptomatic hypoglycemia.    Adjusted patient's carb ratio on his pump from 4 PM to 10 PM, changed from 1:5.5 to 1:7 to better represent what patient has been entering for meals.  He will now enter accurate carb counts and let us know if still having trouble.    Also set up a sleep schedule from 11 PM to 7 AM to help avoid overcorrection while he sleeping.    He can also look into using an extended or delayed bolus if he feels like the Ozempic is slowing down his stomach emptying and causing delayed absorption.    Continue Ozempic 1 mg " weekly.  Call if still struggling with hypoglycemia with the higher dose.    Diabetes will be reassessed in 3 months    Orders:  -     POC Glycosylated Hemoglobin (Hb A1C)  -     POC Glucose, Blood        Return for Next scheduled follow up.    Portions of this note were completed with voice recognition program.  Electronically signed by Rehana Mcgee PA-C  [facility] [place]  08/14/2024

## 2024-08-15 NOTE — ASSESSMENT & PLAN NOTE
Diabetes is improving with treatment.   Medication changes per orders.  Recommended an ADA diet.  Regular aerobic exercise.  Discussed ways to avoid symptomatic hypoglycemia.    Adjusted patient's carb ratio on his pump from 4 PM to 10 PM, changed from 1:5.5 to 1:7 to better represent what patient has been entering for meals.  He will now enter accurate carb counts and let us know if still having trouble.    Also set up a sleep schedule from 11 PM to 7 AM to help avoid overcorrection while he sleeping.    He can also look into using an extended or delayed bolus if he feels like the Ozempic is slowing down his stomach emptying and causing delayed absorption.    Continue Ozempic 1 mg weekly.  Call if still struggling with hypoglycemia with the higher dose.    Diabetes will be reassessed in 3 months

## 2024-08-30 ENCOUNTER — TELEPHONE (OUTPATIENT)
Dept: ENDOCRINOLOGY | Facility: CLINIC | Age: 64
End: 2024-08-30

## 2024-08-30 NOTE — TELEPHONE ENCOUNTER
Caller: Alfredo Oconnor    Relationship to patient: Self    Best call back number: [phone]    Patient is needing: KEERTHI IS NEEDING MORE DOCUMENTATION FOR HIM TO RECEIVE HIS PUMP SUPPLIES

## 2024-11-01 DIAGNOSIS — E03.9 ACQUIRED HYPOTHYROIDISM: ICD-10-CM

## 2024-11-01 RX ORDER — LEVOTHYROXINE SODIUM 50 UG/1
50 TABLET ORAL DAILY
Qty: 90 TABLET | Refills: 0 | Status: SHIPPED | OUTPATIENT
Start: 2024-11-01

## 2024-11-01 NOTE — TELEPHONE ENCOUNTER
Rx Refill Note  Requested Prescriptions     Pending Prescriptions Disp Refills    levothyroxine (SYNTHROID, LEVOTHROID) 50 MCG tablet [Pharmacy Med Name: Levothyroxine Sodium 50 MCG Oral Tablet] 90 tablet 0     Sig: Take 1 tablet by mouth once daily          Last office visit with prescribing clinician: 11/16/2023     Next office visit with prescribing clinician: 11/18/2024   }    Luis Daniel Sage MA  11/01/24, 09:10 EDT

## 2024-11-18 ENCOUNTER — OFFICE VISIT (OUTPATIENT)
Dept: ENDOCRINOLOGY | Facility: CLINIC | Age: 64
End: 2024-11-18
Payer: MEDICARE

## 2024-11-18 VITALS
HEART RATE: 78 BPM | OXYGEN SATURATION: 96 % | BODY MASS INDEX: 31.5 KG/M2 | HEIGHT: 70 IN | SYSTOLIC BLOOD PRESSURE: 142 MMHG | DIASTOLIC BLOOD PRESSURE: 82 MMHG | WEIGHT: 220 LBS

## 2024-11-18 DIAGNOSIS — Z46.81 INSULIN PUMP TITRATION: ICD-10-CM

## 2024-11-18 DIAGNOSIS — E10.65 TYPE 1 DIABETES MELLITUS WITH HYPERGLYCEMIA: Primary | ICD-10-CM

## 2024-11-18 PROBLEM — E10.649 TYPE 1 DIABETES MELLITUS WITH HYPOGLYCEMIA: Status: RESOLVED | Noted: 2020-11-12 | Resolved: 2024-11-18

## 2024-11-18 LAB
EXPIRATION DATE: ABNORMAL
EXPIRATION DATE: ABNORMAL
GLUCOSE BLDC GLUCOMTR-MCNC: 279 MG/DL (ref 70–130)
HBA1C MFR BLD: 8.5 % (ref 4.5–5.7)
Lab: ABNORMAL
Lab: ABNORMAL

## 2024-11-18 PROCEDURE — 1159F MED LIST DOCD IN RCRD: CPT | Performed by: INTERNAL MEDICINE

## 2024-11-18 PROCEDURE — 3052F HG A1C>EQUAL 8.0%<EQUAL 9.0%: CPT | Performed by: INTERNAL MEDICINE

## 2024-11-18 PROCEDURE — 1160F RVW MEDS BY RX/DR IN RCRD: CPT | Performed by: INTERNAL MEDICINE

## 2024-11-18 PROCEDURE — 99214 OFFICE O/P EST MOD 30 MIN: CPT | Performed by: INTERNAL MEDICINE

## 2024-11-18 PROCEDURE — 83036 HEMOGLOBIN GLYCOSYLATED A1C: CPT | Performed by: INTERNAL MEDICINE

## 2024-11-18 PROCEDURE — 3077F SYST BP >= 140 MM HG: CPT | Performed by: INTERNAL MEDICINE

## 2024-11-18 PROCEDURE — 3079F DIAST BP 80-89 MM HG: CPT | Performed by: INTERNAL MEDICINE

## 2024-11-18 PROCEDURE — 82947 ASSAY GLUCOSE BLOOD QUANT: CPT | Performed by: INTERNAL MEDICINE

## 2024-11-18 NOTE — PROGRESS NOTES
Chief Complaint   Patient presents with    Diabetes     Type 1          HPI   Alfredo Oconnor is a 64 y.o. male had concerns including Diabetes (Type 1).    He is checking blood sugars 4+ times per day with dexcom 6 CGM. Data reviewed from the last two weeks shows average glucose 163 with SD of 42. In target range 68%, high 28%, very high 2.7%, low 0.5%, very low 0.1%.   Pattern of: post prandial hyperglycemia on occasion    Current medications for diabetes include Ozempic 1 mg weekly and insulin pump.  He transitions to a different Medicare plan in February 2025    Pump data reviewed from 11/5/2024 through 11/18/2024 shows he is in control IQ 99% of the time.  Average daily dose of insulin 64.89 units, basal 57% or 37.16 units.  Experiences occasional hypoglycemia around 3 to 4 PM, snacks on rad cake and juice. Then has high BG before dinner.     The following portions of the patient's history were reviewed and updated as appropriate: allergies, current medications, past family history, past medical history, past social history, past surgical history, and problem list.       Review of Systems   Constitutional: Negative.    Endocrine:        See HPI        Physical Exam  Vitals reviewed.   Constitutional:       Appearance: Normal appearance.   Cardiovascular:      Rate and Rhythm: Normal rate.      Pulses:           Dorsalis pedis pulses are 2+ on the right side and 2+ on the left side.   Pulmonary:      Effort: Pulmonary effort is normal.   Feet:      Right foot:      Skin integrity: Callus and dry skin present.      Toenail Condition: Right toenails are abnormally thick.      Left foot:      Skin integrity: Callus and dry skin present.      Toenail Condition: Left toenails are abnormally thick.      Comments: Diabetic Foot Exam Performed and Monofilament Test Performed      Monofilament 5/5 bilaterally    Neurological:      General: No focal deficit present.      Mental Status: He is alert. Mental status is at  "baseline.   Psychiatric:         Mood and Affect: Mood normal.         Behavior: Behavior normal.        /82 (BP Location: Left arm, Patient Position: Sitting)   Pulse 78   Ht 177.8 cm (70\")   Wt 99.8 kg (220 lb)   SpO2 96%   BMI 31.57 kg/m²      Labs and imaging    A1C:  Lab Results   Component Value Date    HGBA1C 8.5 (A) 11/18/2024    HGBA1C 8.4 (A) 08/14/2024      Glucose:  Lab Results   Component Value Date    POCGLU 279 (A) 11/18/2024      CMP:  Lab Results   Component Value Date    GLUCOSE 205 (H) 02/23/2024    BUN 22 02/23/2024    CREATININE 1.15 02/23/2024     02/23/2024    K 4.0 02/23/2024     02/23/2024    CALCIUM 9.0 02/23/2024    PROTEINTOT 6.9 02/23/2024    ALBUMIN 4.2 02/23/2024    ALT 20 02/23/2024    AST 24 02/23/2024    ALKPHOS 89 02/23/2024    BILITOT 0.5 02/23/2024    GLOB 2.7 02/23/2024    AGRATIO 1.6 02/23/2024    BCR 19.1 02/23/2024    ANIONGAP 10.9 02/23/2024    EGFR 71.5 02/23/2024      Lipid Panel:  Lab Results   Component Value Date    CHOL 113 02/23/2024    TRIG 146 02/23/2024    HDL 39 (L) 02/23/2024    LDL 49 02/23/2024      Urine Microalbumin:  Lab Results   Component Value Date    MICROALBUR 2.2 02/23/2024      TSH:  Lab Results   Component Value Date    TSH 2.360 02/23/2024        Assessment and plan  Diagnoses and all orders for this visit:    1. Type 1 diabetes mellitus with hyperglycemia (Primary)  Uncontrolled with hyperglycemia, A1c 8.5. Last two weeks of CGM data is better controlled than A1c indicates.   Complicated by mild retinopathy.  Continue Ozempic 1 mg weekly.  After he transitions to a new insurance plan next bring, this is unlikely to be covered.  Patient aware.  Changes were made to the pump as noted below.  Recommended a lower carb snack in the afternoon if glucose is dropping, decrease intake to treat hypoglycemia as he is experiencing rebound hyperglycemia.  Labs are up-to-date from February 2024.  Monofilament updated today.  Ophtho exam is " up-to-date from July 2024.  -     POC Glucose, Blood  -     POC Glycosylated Hemoglobin (Hb A1C)    2. Insulin pump titration  Changed basal rates between 8:30 AM to midnight by increasing all by 0.1 units/h.  This increases total daily basal insulin settings to 31.925 units, more closely mimicking what he receives while in control IQ.       Return in about 3 months (around 2/18/2025) for next scheduled follow up. The patient was instructed to contact the clinic with any interval questions or concerns.    Electronically signed by: Eleonora Wright DO   Endocrinologist    Please note that portions of this note were completed with a voice recognition program.

## 2024-12-17 DIAGNOSIS — E10.65 TYPE 1 DIABETES MELLITUS WITH HYPERGLYCEMIA: ICD-10-CM

## 2024-12-17 RX ORDER — SEMAGLUTIDE 1.34 MG/ML
INJECTION, SOLUTION SUBCUTANEOUS
Qty: 9 ML | Refills: 1 | Status: SHIPPED | OUTPATIENT
Start: 2024-12-17

## 2024-12-17 NOTE — TELEPHONE ENCOUNTER
Rx Refill Note  Requested Prescriptions     Pending Prescriptions Disp Refills    Ozempic, 1 MG/DOSE, 4 MG/3ML solution pen-injector [Pharmacy Med Name: Ozempic (1 MG/DOSE) 4 MG/3ML Subcutaneous Solution Pen-injector] 3 mL 0     Sig: INJECT 1 MG INTO THE APPROPRIATE AREA UNDER THE SKIN ONE TIME EVERY 7 DAYS      Last office visit with prescribing clinician: 11/18/2024     Next office visit with prescribing clinician: 2/20/2025     Emily Caldwell MA  12/17/24, 09:22 EST

## 2025-01-19 DIAGNOSIS — E10.65 TYPE 1 DIABETES MELLITUS WITH HYPERGLYCEMIA: ICD-10-CM

## 2025-01-20 RX ORDER — INSULIN ASPART 100 [IU]/ML
INJECTION, SOLUTION INTRAVENOUS; SUBCUTANEOUS
Qty: 90 ML | Refills: 2 | Status: SHIPPED | OUTPATIENT
Start: 2025-01-20

## 2025-01-20 NOTE — TELEPHONE ENCOUNTER
Rx Refill Note  Requested Prescriptions     Pending Prescriptions Disp Refills    NovoLOG 100 UNIT/ML injection [Pharmacy Med Name: NovoLOG 100 UNIT/ML Subcutaneous Solution] 90 mL 0     Sig: USE AS DIRECTED IN INSULIN PUMP. MAX 90 UNITS PER DAY      Last office visit with prescribing clinician: 8/14/2024   Last telemedicine visit with prescribing clinician: Visit date not found   Next office visit with prescribing clinician: Visit date not found                         Would you like a call back once the refill request has been completed: [] Yes [] No    If the office needs to give you a call back, can they leave a voicemail: [] Yes [] No    Dangelo Enrique MA  01/20/25, 09:44 EST

## 2025-01-29 DIAGNOSIS — E03.9 ACQUIRED HYPOTHYROIDISM: ICD-10-CM

## 2025-01-29 RX ORDER — LEVOTHYROXINE SODIUM 50 UG/1
50 TABLET ORAL DAILY
Qty: 90 TABLET | Refills: 0 | Status: SHIPPED | OUTPATIENT
Start: 2025-01-29

## 2025-01-29 NOTE — TELEPHONE ENCOUNTER
Rx Refill Note  Requested Prescriptions     Pending Prescriptions Disp Refills    levothyroxine (SYNTHROID, LEVOTHROID) 50 MCG tablet [Pharmacy Med Name: Levothyroxine Sodium 50 MCG Oral Tablet] 90 tablet 0     Sig: Take 1 tablet by mouth once daily          Last office visit with prescribing clinician: 11/18/24    Next office visit with prescribing clinician:2/20/25        Sharifa Reeves MA  01/29/25, 08:42 EST

## 2025-03-04 ENCOUNTER — DOCUMENTATION (OUTPATIENT)
Dept: ENDOCRINOLOGY | Facility: CLINIC | Age: 65
End: 2025-03-04

## 2025-03-04 ENCOUNTER — OFFICE VISIT (OUTPATIENT)
Dept: ENDOCRINOLOGY | Facility: CLINIC | Age: 65
End: 2025-03-04
Payer: MEDICARE

## 2025-03-04 ENCOUNTER — TELEPHONE (OUTPATIENT)
Dept: ENDOCRINOLOGY | Facility: CLINIC | Age: 65
End: 2025-03-04

## 2025-03-04 VITALS
HEIGHT: 70 IN | WEIGHT: 225.2 LBS | HEART RATE: 81 BPM | OXYGEN SATURATION: 95 % | SYSTOLIC BLOOD PRESSURE: 112 MMHG | BODY MASS INDEX: 32.24 KG/M2 | DIASTOLIC BLOOD PRESSURE: 48 MMHG

## 2025-03-04 DIAGNOSIS — E03.9 ACQUIRED HYPOTHYROIDISM: ICD-10-CM

## 2025-03-04 DIAGNOSIS — E78.2 MIXED HYPERLIPIDEMIA: ICD-10-CM

## 2025-03-04 DIAGNOSIS — E10.65 TYPE 1 DIABETES MELLITUS WITH HYPERGLYCEMIA: Primary | ICD-10-CM

## 2025-03-04 DIAGNOSIS — Z46.81 INSULIN PUMP TITRATION: ICD-10-CM

## 2025-03-04 LAB
EXPIRATION DATE: ABNORMAL
EXPIRATION DATE: ABNORMAL
GLUCOSE BLDC GLUCOMTR-MCNC: 242 MG/DL (ref 70–130)
HBA1C MFR BLD: 8.9 % (ref 4.5–5.7)
Lab: ABNORMAL
Lab: ABNORMAL

## 2025-03-04 RX ORDER — ACYCLOVIR 400 MG/1
1 TABLET ORAL SEE ADMIN INSTRUCTIONS
Qty: 1 EACH | Refills: 0 | Status: SHIPPED | OUTPATIENT
Start: 2025-03-04

## 2025-03-04 RX ORDER — ACYCLOVIR 400 MG/1
1 TABLET ORAL
Qty: 9 EACH | Refills: 3 | Status: SHIPPED | OUTPATIENT
Start: 2025-03-04

## 2025-03-04 NOTE — PROGRESS NOTES
"Chief Complaint   Patient presents with    Diabetes     Type I Diabetes           HPI   Alfredo Oconnor is a 64 y.o. male had concerns including Diabetes (Type I Diabetes ).    He is checking blood sugars 4+ times per day with dexcom 6 CGM. Data reviewed from the last two weeks shows average glucose 174 with standard deviation of 55. In target range 59%, high 29%, very high 10%, low 0.5%, very low 0.4%.   Pattern of: Postprandial hyperglycemia at times.  Low BGs are high they take a long time to improve.  Occasional early afternoon hypoglycemia, several hours after lunch  CGM reading up to 60+ points lower than fingerstick reading today.  GMI is 7.5%. A1c up to 8.9.    Current medications for diabetes include ozempic 1 mg weekly and insulin pump.  Insurance will stop covering ozempic May 1, 2025    The following portions of the patient's history were reviewed and updated as appropriate: allergies, current medications, past family history, past medical history, past social history, past surgical history, and problem list.      Review of Systems   Constitutional: Negative.    Endocrine:        See HPI        Physical Exam  Vitals reviewed.   Constitutional:       Appearance: Normal appearance.   Cardiovascular:      Rate and Rhythm: Normal rate.   Pulmonary:      Effort: Pulmonary effort is normal.   Neurological:      General: No focal deficit present.      Mental Status: He is alert. Mental status is at baseline.   Psychiatric:         Mood and Affect: Mood normal.         Behavior: Behavior normal.        /48 (BP Location: Right arm, Patient Position: Sitting)   Pulse 81   Ht 177.8 cm (70\")   Wt 102 kg (225 lb 3.2 oz)   SpO2 95%   BMI 32.31 kg/m²      Labs and imaging    A1C:  Lab Results   Component Value Date    HGBA1C 8.9 (A) 03/04/2025    HGBA1C 8.5 (A) 11/18/2024      Glucose:  Lab Results   Component Value Date    POCGLU 242 (A) 03/04/2025      CMP:  Lab Results   Component Value Date    GLUCOSE 205 " (H) 02/23/2024    BUN 22 02/23/2024    CREATININE 1.15 02/23/2024     02/23/2024    K 4.0 02/23/2024     02/23/2024    CALCIUM 9.0 02/23/2024    PROTEINTOT 6.9 02/23/2024    ALBUMIN 4.2 02/23/2024    ALT 20 02/23/2024    AST 24 02/23/2024    ALKPHOS 89 02/23/2024    BILITOT 0.5 02/23/2024    GLOB 2.7 02/23/2024    AGRATIO 1.6 02/23/2024    BCR 19.1 02/23/2024    ANIONGAP 10.9 02/23/2024    EGFR 71.5 02/23/2024      Lipid Panel:  Lab Results   Component Value Date    CHOL 113 02/23/2024    TRIG 146 02/23/2024    HDL 39 (L) 02/23/2024    LDL 49 02/23/2024      Urine Microalbumin:  Lab Results   Component Value Date    MALBCRERATIO 7.7 02/23/2024      TSH:  Lab Results   Component Value Date    TSH 2.360 02/23/2024        Assessment and plan  Diagnoses and all orders for this visit:    1. Type 1 diabetes mellitus with hyperglycemia (Primary)  Uncontrolled with hyperglycemia, A1c 8.9. CGM is reading 60+ points lower than fingerstick, causing discrepancy with pump data and A1c.   Complicated by mild retinopathy.  Continue Ozempic 1 mg weekly.  Will no longer be covered 5/1/25.   Changes were made to the pump as noted below.  Will transition to Dexcom 7.  He needs to start checking fingerstick readings and calibrating Dexcom 6.  Diabetes educator met with patient to review this transition/upgrade for the tandem pump.  Will work with DME to get the Dexcom 7 administered.  Labs are due and orders entered.  Monofilament updated 11/24.  Ophtho exam is up-to-date from July 2024.  -     POC Glucose, Blood  -     POC Glycosylated Hemoglobin (Hb A1C)  -     Comprehensive Metabolic Panel; Future  -     Microalbumin / Creatinine Urine Ratio - Urine, Clean Catch; Future    2. Insulin pump titration  Eliminated rates at 7:30 AM and 7 PM.  Decreased correction factor between 8:30 AM to 4 PM to 35 from 40, and from 4 PM to midnight to 30 from 35.  Changed carb ratio from 11:30 AM to 4 PM to 7 from 6.5.  These changes  adjusted his total daily basal insulin to 32.525 units.    3. Acquired hypothyroidism  On levothyroxine 50 mcg daily.  Check TSH.  -     TSH; Future    4. Mixed hyperlipidemia  On rosuvastatin 10 mg daily. Check fasting lipids.  -     Lipid Panel; Future         Return in about 3 months (around 6/4/2025) for next scheduled follow up. The patient was instructed to contact the clinic with any interval questions or concerns.    Electronically signed by: Eleonora Wright DO   Endocrinologist    Please note that portions of this note were completed with a voice recognition program.

## 2025-03-04 NOTE — PROGRESS NOTES
Met with patient to discuss Dexcom calibrations and updating software for G7 compatibility. Will have [employer] rep call pt to assist with update.

## 2025-03-04 NOTE — TELEPHONE ENCOUNTER
PATIENT IS CALLING US TO SPEAK WITH KIERRA. HE HAS GOT HIS DEXCOM SET UP AND NEEDS PRESCRIPTION FOR DEXCOM G6 SENSORS. THE DEXCOM G6 SENSORS NEEDS TO GO TO [facility]. PATIENT WOULD LIKE A CALL BACK [phone]

## 2025-04-23 DIAGNOSIS — E03.9 ACQUIRED HYPOTHYROIDISM: ICD-10-CM

## 2025-04-23 RX ORDER — LEVOTHYROXINE SODIUM 50 UG/1
50 TABLET ORAL DAILY
Qty: 90 TABLET | Refills: 0 | Status: SHIPPED | OUTPATIENT
Start: 2025-04-23

## 2025-04-23 NOTE — TELEPHONE ENCOUNTER
Rx Refill Note  Requested Prescriptions     Pending Prescriptions Disp Refills    levothyroxine (SYNTHROID, LEVOTHROID) 50 MCG tablet [Pharmacy Med Name: Levothyroxine Sodium 50 MCG Oral Tablet] 90 tablet 0     Sig: Take 1 tablet by mouth once daily      Last office visit with prescribing clinician: 3/4/2025     Next office visit with prescribing clinician: 5/21/2025     Tracy Michelle MA  04/23/25, 10:00 EDT

## 2025-05-20 ENCOUNTER — LAB (OUTPATIENT)
Dept: LAB | Facility: HOSPITAL | Age: 65
End: 2025-05-20
Payer: MEDICARE

## 2025-05-20 DIAGNOSIS — E10.65 TYPE 1 DIABETES MELLITUS WITH HYPERGLYCEMIA: ICD-10-CM

## 2025-05-20 DIAGNOSIS — E78.2 MIXED HYPERLIPIDEMIA: ICD-10-CM

## 2025-05-20 DIAGNOSIS — E03.9 ACQUIRED HYPOTHYROIDISM: ICD-10-CM

## 2025-05-20 LAB
ALBUMIN SERPL-MCNC: 4 G/DL (ref 3.5–5.2)
ALBUMIN UR-MCNC: <1.2 MG/DL
ALBUMIN/GLOB SERPL: 1.3 G/DL
ALP SERPL-CCNC: 88 U/L (ref 39–117)
ALT SERPL W P-5'-P-CCNC: 17 U/L (ref 1–41)
ANION GAP SERPL CALCULATED.3IONS-SCNC: 9 MMOL/L (ref 5–15)
AST SERPL-CCNC: 22 U/L (ref 1–40)
BILIRUB SERPL-MCNC: 0.6 MG/DL (ref 0–1.2)
BUN SERPL-MCNC: 16 MG/DL (ref 8–23)
BUN/CREAT SERPL: 15.4 (ref 7–25)
CALCIUM SPEC-SCNC: 9.4 MG/DL (ref 8.6–10.5)
CHLORIDE SERPL-SCNC: 102 MMOL/L (ref 98–107)
CHOLEST SERPL-MCNC: 126 MG/DL (ref 0–200)
CO2 SERPL-SCNC: 25 MMOL/L (ref 22–29)
CREAT SERPL-MCNC: 1.04 MG/DL (ref 0.76–1.27)
CREAT UR-MCNC: 113.2 MG/DL
EGFRCR SERPLBLD CKD-EPI 2021: 79.7 ML/MIN/1.73
GLOBULIN UR ELPH-MCNC: 3 GM/DL
GLUCOSE SERPL-MCNC: 170 MG/DL (ref 65–99)
HDLC SERPL-MCNC: 38 MG/DL (ref 40–60)
LDLC SERPL CALC-MCNC: 66 MG/DL (ref 0–100)
LDLC/HDLC SERPL: 1.67 {RATIO}
MICROALBUMIN/CREAT UR: NORMAL MG/G{CREAT}
POTASSIUM SERPL-SCNC: 4.1 MMOL/L (ref 3.5–5.2)
PROT SERPL-MCNC: 7 G/DL (ref 6–8.5)
SODIUM SERPL-SCNC: 136 MMOL/L (ref 136–145)
TRIGL SERPL-MCNC: 123 MG/DL (ref 0–150)
TSH SERPL DL<=0.05 MIU/L-ACNC: 2.3 UIU/ML (ref 0.27–4.2)
VLDLC SERPL-MCNC: 22 MG/DL (ref 5–40)

## 2025-05-20 PROCEDURE — 82570 ASSAY OF URINE CREATININE: CPT

## 2025-05-20 PROCEDURE — 80053 COMPREHEN METABOLIC PANEL: CPT

## 2025-05-20 PROCEDURE — 82043 UR ALBUMIN QUANTITATIVE: CPT

## 2025-05-20 PROCEDURE — 84443 ASSAY THYROID STIM HORMONE: CPT

## 2025-05-20 PROCEDURE — 80061 LIPID PANEL: CPT

## 2025-05-21 ENCOUNTER — OFFICE VISIT (OUTPATIENT)
Dept: ENDOCRINOLOGY | Facility: CLINIC | Age: 65
End: 2025-05-21
Payer: MEDICARE

## 2025-05-21 VITALS
SYSTOLIC BLOOD PRESSURE: 116 MMHG | HEIGHT: 70 IN | HEART RATE: 64 BPM | WEIGHT: 222.8 LBS | DIASTOLIC BLOOD PRESSURE: 74 MMHG | BODY MASS INDEX: 31.9 KG/M2 | OXYGEN SATURATION: 96 %

## 2025-05-21 DIAGNOSIS — E78.2 MIXED HYPERLIPIDEMIA: ICD-10-CM

## 2025-05-21 DIAGNOSIS — E03.9 ACQUIRED HYPOTHYROIDISM: ICD-10-CM

## 2025-05-21 DIAGNOSIS — E10.65 TYPE 1 DIABETES MELLITUS WITH HYPERGLYCEMIA: Primary | ICD-10-CM

## 2025-05-21 DIAGNOSIS — Z46.81 INSULIN PUMP TITRATION: ICD-10-CM

## 2025-05-21 LAB
EXPIRATION DATE: ABNORMAL
GLUCOSE BLDC GLUCOMTR-MCNC: 211 MG/DL (ref 70–130)
Lab: ABNORMAL

## 2025-05-21 RX ORDER — INSULIN ASPART INJECTION 100 [IU]/ML
100 INJECTION, SOLUTION SUBCUTANEOUS DAILY
Qty: 100 ML | Refills: 1 | Status: SHIPPED | OUTPATIENT
Start: 2025-05-21

## 2025-05-21 RX ORDER — LEVOTHYROXINE SODIUM 50 UG/1
50 TABLET ORAL DAILY
Qty: 90 TABLET | Refills: 3 | Status: SHIPPED | OUTPATIENT
Start: 2025-05-21

## 2025-05-21 NOTE — PATIENT INSTRUCTIONS
Patients with diabetes should have a yearly eye exam. Please be sure to schedule this at least once yearly and have the eye exam report faxed to [phone].

## 2025-05-21 NOTE — PROGRESS NOTES
Chief Complaint   Patient presents with    Diabetes     Type I Diabetes Mellitus with Hyperglycemia    Insulin Pump Titration    Hypothyroidism     Acquired    Hyperlipidemia     Mixed          HPI   Alfredo Oconnor is a 65 y.o. male had concerns including Diabetes (Type I Diabetes Mellitus with Hyperglycemia), Insulin Pump Titration, Hypothyroidism (Acquired), and Hyperlipidemia (Mixed).    He is checking blood sugars 4+ times per day with dexcom 7 CGM. Data reviewed from the last two weeks shows average glucose 189 with standard deviation of 61. In target range 48%, high 34%, very high 17%, low 0.9%, very low 0%.   Pattern of: Postprandial hyperglycemia, hypoglycemia after correcting high BGs    Current medications for diabetes include tandem insulin pump.  Insurance is no longer covering Ozempic.  Pump data reviewed from 5/8/2025 through 5/21/2025 shows total daily insulin 80.72 units, basal 56% or 45.57 units.  Bolus 44%.   Time in control IQ 97%.    Also on levothyroxine 50 mcg daily and rosuvastatin 10 mg daily.   Feels good. Denies fatigue.    Struggling recently with sciatica. Was on a week of steroids. Bgs running higher.    The following portions of the patient's history were reviewed and updated as appropriate: allergies, current medications, past family history, past medical history, past social history, past surgical history, and problem list.      Review of Systems   Constitutional:  Positive for activity change.   Endocrine: Negative.    Musculoskeletal:         Sciatica        Physical Exam  Vitals reviewed.   Constitutional:       Appearance: Normal appearance.   Cardiovascular:      Rate and Rhythm: Normal rate.   Pulmonary:      Effort: Pulmonary effort is normal.   Neurological:      General: No focal deficit present.      Mental Status: He is alert. Mental status is at baseline.   Psychiatric:         Mood and Affect: Mood normal.         Behavior: Behavior normal.        /74 (BP Location:  "Left arm, Patient Position: Sitting, Cuff Size: Adult)   Pulse 64   Ht 177.8 cm (70\")   Wt 101 kg (222 lb 12.8 oz)   SpO2 96%   BMI 31.97 kg/m²      Labs and imaging    A1C:  Lab Results   Component Value Date    HGBA1C 8.9 (A) 03/04/2025    HGBA1C 8.5 (A) 11/18/2024      Glucose:  Lab Results   Component Value Date    POCGLU 211 (A) 05/21/2025      CMP:  Lab Results   Component Value Date    GLUCOSE 170 (H) 05/20/2025    BUN 16 05/20/2025    CREATININE 1.04 05/20/2025     05/20/2025    K 4.1 05/20/2025     05/20/2025    CALCIUM 9.4 05/20/2025    PROTEINTOT 7.0 05/20/2025    ALBUMIN 4.0 05/20/2025    ALT 17 05/20/2025    AST 22 05/20/2025    ALKPHOS 88 05/20/2025    BILITOT 0.6 05/20/2025    GLOB 3.0 05/20/2025    AGRATIO 1.3 05/20/2025    BCR 15.4 05/20/2025    ANIONGAP 9.0 05/20/2025    EGFR 79.7 05/20/2025      Lipid Panel:  Lab Results   Component Value Date    CHOL 126 05/20/2025    TRIG 123 05/20/2025    HDL 38 (L) 05/20/2025    LDL 66 05/20/2025      Urine Microalbumin:  Lab Results   Component Value Date    MALBCRERATIO  05/20/2025      Comment:      Unable to calculate      TSH:  Lab Results   Component Value Date    TSH 2.300 05/20/2025        Assessment and plan  Diagnoses and all orders for this visit:    1. Type 1 diabetes mellitus with hyperglycemia (Primary)  Uncontrolled with hyperglycemia, last A1c 8.9.   Complicated by retinopathy.  No longer on ozempic. Insurance no longer covers.  Change to fiasp if covered.   Changes were made to the pump as noted below.  Labs are UTD 5/20/25.  Monofilament updated 11/24.  Ophtho exam is up-to-date - is getting injections, last was 2 months ago.  -     POC Glucose, Blood  -     Insulin Aspart, w/Niacinamide, (Fiasp) 100 UNIT/ML solution; Inject 100 Units as directed Daily. Per pump  Dispense: 100 mL; Refill: 1    2. Insulin pump titration  No change to basal rates today due to recent steroid prescription but consider for future as current " settings are less than he receives in control IQ.  Changed carb ratio between 4 AM to midnight to 6 from 7 to administer more insulin with meals.    3. Acquired hypothyroidism  TSH normal range on levothyroxine 50 mcg daily.  He denies fatigue.  Monitor yearly.  -     levothyroxine (SYNTHROID, LEVOTHROID) 50 MCG tablet; Take 1 tablet by mouth Daily.  Dispense: 90 tablet; Refill: 3    4. Mixed hyperlipidemia  HDL slightly low.  Increase exercise as tolerated.  Continue rosuvastatin 10 mg daily.  Monitor lipids yearly.       Return in about 3 months (around 8/21/2025) for next scheduled follow up. The patient was instructed to contact the clinic with any interval questions or concerns.    Electronically signed by: Eleonora Wright DO   Endocrinologist    Please note that portions of this note were completed with a voice recognition program.

## 2025-06-03 DIAGNOSIS — E10.65 TYPE 1 DIABETES MELLITUS WITH HYPERGLYCEMIA: ICD-10-CM

## 2025-06-03 RX ORDER — SEMAGLUTIDE 1.34 MG/ML
INJECTION, SOLUTION SUBCUTANEOUS
Qty: 9 ML | Refills: 0 | OUTPATIENT
Start: 2025-06-03

## 2025-06-09 ENCOUNTER — APPOINTMENT (OUTPATIENT)
Dept: MRI IMAGING | Facility: HOSPITAL | Age: 65
End: 2025-06-09
Payer: MEDICARE

## 2025-06-09 ENCOUNTER — HOSPITAL ENCOUNTER (EMERGENCY)
Facility: HOSPITAL | Age: 65
Discharge: HOME | End: 2025-06-09
Attending: EMERGENCY MEDICINE | Admitting: EMERGENCY MEDICINE
Payer: MEDICARE

## 2025-06-09 ENCOUNTER — APPOINTMENT (OUTPATIENT)
Dept: GENERAL RADIOLOGY | Facility: HOSPITAL | Age: 65
End: 2025-06-09
Payer: MEDICARE

## 2025-06-09 VITALS
OXYGEN SATURATION: 96 % | SYSTOLIC BLOOD PRESSURE: 146 MMHG | HEART RATE: 79 BPM | WEIGHT: 217 LBS | HEIGHT: 70 IN | RESPIRATION RATE: 17 BRPM | TEMPERATURE: 97.9 F | BODY MASS INDEX: 31.07 KG/M2 | DIASTOLIC BLOOD PRESSURE: 84 MMHG

## 2025-06-09 DIAGNOSIS — R27.0 ATAXIA: ICD-10-CM

## 2025-06-09 DIAGNOSIS — Z86.39 HISTORY OF DIABETES MELLITUS: ICD-10-CM

## 2025-06-09 DIAGNOSIS — R42 VERTIGO: Primary | ICD-10-CM

## 2025-06-09 DIAGNOSIS — Z86.79 HISTORY OF HYPERTENSION: ICD-10-CM

## 2025-06-09 LAB
ALBUMIN SERPL-MCNC: 4.3 G/DL (ref 3.5–5.2)
ALBUMIN/GLOB SERPL: 1.6 G/DL
ALP SERPL-CCNC: 86 U/L (ref 39–117)
ALT SERPL W P-5'-P-CCNC: 29 U/L (ref 1–41)
ANION GAP SERPL CALCULATED.3IONS-SCNC: 9 MMOL/L (ref 5–15)
AST SERPL-CCNC: 29 U/L (ref 1–40)
BASOPHILS # BLD AUTO: 0.04 10*3/MM3 (ref 0–0.2)
BASOPHILS NFR BLD AUTO: 0.5 % (ref 0–1.5)
BILIRUB SERPL-MCNC: 0.4 MG/DL (ref 0–1.2)
BILIRUB UR QL STRIP: NEGATIVE
BUN SERPL-MCNC: 15.6 MG/DL (ref 8–23)
BUN/CREAT SERPL: 16.8 (ref 7–25)
CALCIUM SPEC-SCNC: 9.3 MG/DL (ref 8.6–10.5)
CHLORIDE SERPL-SCNC: 103 MMOL/L (ref 98–107)
CLARITY UR: CLEAR
CO2 SERPL-SCNC: 29 MMOL/L (ref 22–29)
COLOR UR: YELLOW
CREAT SERPL-MCNC: 0.93 MG/DL (ref 0.76–1.27)
DEPRECATED RDW RBC AUTO: 42.5 FL (ref 37–54)
EGFRCR SERPLBLD CKD-EPI 2021: 91.1 ML/MIN/1.73
EOSINOPHIL # BLD AUTO: 0.15 10*3/MM3 (ref 0–0.4)
EOSINOPHIL NFR BLD AUTO: 1.8 % (ref 0.3–6.2)
ERYTHROCYTE [DISTWIDTH] IN BLOOD BY AUTOMATED COUNT: 12.9 % (ref 12.3–15.4)
GLOBULIN UR ELPH-MCNC: 2.7 GM/DL
GLUCOSE SERPL-MCNC: 126 MG/DL (ref 65–99)
GLUCOSE UR STRIP-MCNC: ABNORMAL MG/DL
HCT VFR BLD AUTO: 44.5 % (ref 37.5–51)
HGB BLD-MCNC: 14.4 G/DL (ref 13–17.7)
HGB UR QL STRIP.AUTO: NEGATIVE
HOLD SPECIMEN: NORMAL
IMM GRANULOCYTES # BLD AUTO: 0.02 10*3/MM3 (ref 0–0.05)
IMM GRANULOCYTES NFR BLD AUTO: 0.2 % (ref 0–0.5)
KETONES UR QL STRIP: NEGATIVE
LEUKOCYTE ESTERASE UR QL STRIP.AUTO: NEGATIVE
LYMPHOCYTES # BLD AUTO: 1.55 10*3/MM3 (ref 0.7–3.1)
LYMPHOCYTES NFR BLD AUTO: 18.8 % (ref 19.6–45.3)
MAGNESIUM SERPL-MCNC: 2.2 MG/DL (ref 1.6–2.4)
MCH RBC QN AUTO: 29.1 PG (ref 26.6–33)
MCHC RBC AUTO-ENTMCNC: 32.4 G/DL (ref 31.5–35.7)
MCV RBC AUTO: 90.1 FL (ref 79–97)
MONOCYTES # BLD AUTO: 0.59 10*3/MM3 (ref 0.1–0.9)
MONOCYTES NFR BLD AUTO: 7.2 % (ref 5–12)
NEUTROPHILS NFR BLD AUTO: 5.88 10*3/MM3 (ref 1.7–7)
NEUTROPHILS NFR BLD AUTO: 71.5 % (ref 42.7–76)
NITRITE UR QL STRIP: NEGATIVE
NRBC BLD AUTO-RTO: 0 /100 WBC (ref 0–0.2)
PH UR STRIP.AUTO: 7 [PH] (ref 5–8)
PLATELET # BLD AUTO: 296 10*3/MM3 (ref 140–450)
PMV BLD AUTO: 9.8 FL (ref 6–12)
POTASSIUM SERPL-SCNC: 4.1 MMOL/L (ref 3.5–5.2)
PROT SERPL-MCNC: 7 G/DL (ref 6–8.5)
PROT UR QL STRIP: NEGATIVE
RBC # BLD AUTO: 4.94 10*6/MM3 (ref 4.14–5.8)
SODIUM SERPL-SCNC: 141 MMOL/L (ref 136–145)
SP GR UR STRIP: 1.01 (ref 1–1.03)
TROPONIN T SERPL HS-MCNC: 9 NG/L
UROBILINOGEN UR QL STRIP: ABNORMAL
WBC NRBC COR # BLD AUTO: 8.23 10*3/MM3 (ref 3.4–10.8)
WHOLE BLOOD HOLD COAG: NORMAL
WHOLE BLOOD HOLD SPECIMEN: NORMAL

## 2025-06-09 PROCEDURE — 93005 ELECTROCARDIOGRAM TRACING: CPT

## 2025-06-09 PROCEDURE — 81003 URINALYSIS AUTO W/O SCOPE: CPT | Performed by: EMERGENCY MEDICINE

## 2025-06-09 PROCEDURE — 85025 COMPLETE CBC W/AUTO DIFF WBC: CPT | Performed by: EMERGENCY MEDICINE

## 2025-06-09 PROCEDURE — 70551 MRI BRAIN STEM W/O DYE: CPT

## 2025-06-09 PROCEDURE — 93005 ELECTROCARDIOGRAM TRACING: CPT | Performed by: EMERGENCY MEDICINE

## 2025-06-09 PROCEDURE — 99284 EMERGENCY DEPT VISIT MOD MDM: CPT

## 2025-06-09 PROCEDURE — 70547 MR ANGIOGRAPHY NECK W/O DYE: CPT

## 2025-06-09 PROCEDURE — 83735 ASSAY OF MAGNESIUM: CPT | Performed by: EMERGENCY MEDICINE

## 2025-06-09 PROCEDURE — 84484 ASSAY OF TROPONIN QUANT: CPT | Performed by: EMERGENCY MEDICINE

## 2025-06-09 PROCEDURE — 80053 COMPREHEN METABOLIC PANEL: CPT | Performed by: EMERGENCY MEDICINE

## 2025-06-09 PROCEDURE — 70544 MR ANGIOGRAPHY HEAD W/O DYE: CPT

## 2025-06-09 PROCEDURE — 71045 X-RAY EXAM CHEST 1 VIEW: CPT

## 2025-06-09 RX ORDER — MECLIZINE HYDROCHLORIDE 25 MG/1
25 TABLET ORAL EVERY 6 HOURS PRN
Qty: 12 TABLET | Refills: 0 | Status: SHIPPED | OUTPATIENT
Start: 2025-06-09

## 2025-06-09 RX ORDER — SODIUM CHLORIDE 0.9 % (FLUSH) 0.9 %
10 SYRINGE (ML) INJECTION AS NEEDED
Status: DISCONTINUED | OUTPATIENT
Start: 2025-06-09 | End: 2025-06-09 | Stop reason: HOSPADM

## 2025-06-09 NOTE — ED PROVIDER NOTES
[place]    EMERGENCY DEPARTMENT ENCOUNTER      Pt Name: Alfredo Oconnor  MRN: [id number]  YOB: 1960  Date of evaluation: 6/9/2025  Provider: Herrera Sage MD    CHIEF COMPLAINT       Chief Complaint   Patient presents with    Dizziness         HISTORY OF PRESENT ILLNESS   Alfredo Oconnor is a 65 y.o. male who presents to the emergency department with complaint of dizziness over the course the past 2 days.  Patient says that when he stands up, he feels somewhat like things are spinning and feels little bit off balance when he is walking.  He denies any prior history of vertigo.  Denies any associated headache, neck pain, or chest pain.  He has not had any visual changes, difficulty with speech or swallowing, weakness or numbness on one side of his body.  No prior history of CVA.  He denies any ear pain or discomfort.      Nursing notes were reviewed.    REVIEW OF SYSTEMS     ROS:  A chief complaint appropriate review of systems was completed and is negative except as noted in the HPI.      PAST MEDICAL HISTORY     Past Medical History:   Diagnosis Date    Hypertensive disorder     Hypoglycemia due to type 1 diabetes mellitus     Hypothyroidism     Insulin pump in place     Mixed hyperlipidemia     Obesity     Sciatica 05/01/2025    Type 1 diabetes mellitus, uncontrolled     Vitamin D deficiency          SURGICAL HISTORY       Past Surgical History:   Procedure Laterality Date    CATARACT EXTRACTION      VASECTOMY           CURRENT MEDICATIONS       Current Facility-Administered Medications:     sodium chloride 0.9 % flush 10 mL, 10 mL, Intravenous, PRN, Herrera Sage MD    Current Outpatient Medications:     ACETAMINOPHEN PO, Take 1,000 mg by mouth Daily As Needed., Disp: , Rfl:     aspirin 81 MG chewable tablet, Chew 1 tablet Daily., Disp: , Rfl:     celecoxib (CeleBREX) 200 MG capsule, Daily., Disp: , Rfl:     cetirizine (zyrTEC) 10 MG tablet, Take 1 tablet by mouth Daily., Disp: , Rfl:      Continuous Glucose  (Dexcom G7 ) device, Use 1 each See Admin Instructions., Disp: 1 each, Rfl: 0    Continuous Glucose Sensor (Dexcom G7 Sensor) misc, Use 1 each Every 10 (Ten) Days., Disp: 9 each, Rfl: 3    famotidine (PEPCID) 20 MG tablet, Take 1 tablet by mouth 2 (Two) Times a Day., Disp: , Rfl:     glucose blood test strip, 1 each 4 (Four) Times a Day. As needed - on cgm, Disp: , Rfl:     hydroCHLOROthiazide (HYDRODIURIL) 25 MG tablet, Daily., Disp: , Rfl:     Insulin Aspart, w/Niacinamide, (Fiasp) 100 UNIT/ML solution, Inject 100 Units as directed Daily. Per pump, Disp: 100 mL, Rfl: 1    Insulin Glargine (Lantus SoloStar) 100 UNIT/ML injection pen, PRN off insulin pump, 40 units once daily, Disp: , Rfl:     Insulin Infusion Pump (T:slim X2 Insulin Pump) device, Use as directed with Humalog insulin, Disp: , Rfl:     Insulin Infusion Pump Supplies (T:slim X2 3mL Cartridge) misc, Use as directed in Tslim pump, Disp: , Rfl:     levothyroxine (SYNTHROID, LEVOTHROID) 50 MCG tablet, Take 1 tablet by mouth Daily., Disp: 90 tablet, Rfl: 3    meclizine (ANTIVERT) 25 MG tablet, Take 1 tablet by mouth Every 6 (Six) Hours As Needed for Dizziness., Disp: 12 tablet, Rfl: 0    NovoLOG 100 UNIT/ML injection, USE AS DIRECTED IN INSULIN PUMP. MAX 90 UNITS PER DAY, Disp: 90 mL, Rfl: 2    olmesartan (BENICAR) 20 MG tablet, Take 1 tablet by mouth Daily., Disp: , Rfl:     rosuvastatin (CRESTOR) 10 MG tablet, Take 1 tablet by mouth Daily., Disp: 90 tablet, Rfl: 3    tamsulosin (FLOMAX) 0.4 MG capsule 24 hr capsule, As needed, Disp: , Rfl:     VITAMIN D PO, Take  by mouth Daily., Disp: , Rfl:     ALLERGIES     Patient has no known allergies.    FAMILY HISTORY       Family History   Problem Relation Age of Onset    Hypertension Father     Heart disease Mother     Hypertension Mother     Hypothyroidism Sister           SOCIAL HISTORY       Social History     Socioeconomic History    Marital status:    Tobacco Use  "   Smoking status: Former     Current packs/day: 0.00     Average packs/day: 2.0 packs/day for 25.0 years (50.0 ttl pk-yrs)     Types: Cigarettes     Start date:      Quit date:      Years since quittin.4     Passive exposure: Past    Smokeless tobacco: Never   Vaping Use    Vaping status: Never Used   Substance and Sexual Activity    Alcohol use: Yes     Comment: social    Drug use: Never    Sexual activity: Defer         PHYSICAL EXAM    (up to 7 for level 4, 8 or more for level 5)     Vitals:    25 1440   BP: 146/84   BP Location: Left arm   Patient Position: Sitting   Pulse: 79   Resp: 17   Temp: 97.9 °F (36.6 °C)   TempSrc: Oral   SpO2: 96%   Weight: 98.4 kg (217 lb)   Height: 177.8 cm (70\")       General: Awake, alert, no acute distress.  HEENT: Conjunctivae normal.  Neck: Trachea midline.  Cardiac: Heart regular rate, rhythm, no murmurs, rubs, or gallops  Lungs: Lungs are clear to auscultation, there is no wheezing, rhonchi, or rales. There is no use of accessory muscles.  Abdomen: Abdomen is soft, nontender, nondistended. There are no firm or pulsatile masses, no rebound rigidity or guarding.   Musculoskeletal: No deformity.  Neuro: Alert and oriented x4.  Cranial nerves II through XII are grossly intact.  There are no visual field deficits.  Cerebellar function intact with finger-to-nose and heel-to-shin testing.  Patient observed ambulating by myself and there is no evidence of ataxia or other gait abnormality.  Motor strength is intact in the face and strength is 5 out of 5 in all 4 extremities without any asymmetry.  Sensation to light touch is intact in all 4 extremities without any asymmetry.  Dermatology: Skin is warm and dry  Psych: Mentation is grossly normal, cognition is grossly normal. Affect is appropriate.        DIAGNOSTIC RESULTS     EKG: All EKGs are interpreted by the Emergency Department Physician who either signs or Co-signs this chart in the absence of a " cardiologist.    ECG 12 Lead Other; Dizziness   Preliminary Result   Test Reason : Other~   Blood Pressure :   */*   mmHG   Vent. Rate :  68 BPM     Atrial Rate :  68 BPM      P-R Int : 158 ms          QRS Dur :  90 ms       QT Int : 390 ms       P-R-T Axes :  39  73  42 degrees     QTcB Int : 414 ms      Normal sinus rhythm   Normal ECG   When compared with ECG of 09-Jun-2025 14:45, (Unconfirmed)   No significant change was found      Referred By:            Confirmed By:       ECG 12 Lead Other; Dizziness   Preliminary Result   Test Reason : Other~   Blood Pressure :   */*   mmHG   Vent. Rate :  73 BPM     Atrial Rate :  73 BPM      P-R Int : 158 ms          QRS Dur :  90 ms       QT Int : 386 ms       P-R-T Axes :  39  80  28 degrees     QTcB Int : 425 ms      Normal sinus rhythm   Normal ECG   No previous ECGs available      Referred By: PRECIOUS           Confirmed By:             RADIOLOGY:   [x] Radiologist's Report Reviewed:  MRI Angiogram Neck Without Contrast   Final Result   1.Findings suggestive of mild chronic microvascular ischemic change.   2.No diffusion restriction is identified to suggest acute infarct.   3.No acute abnormality is identified within the large arteries of the head or neck.   4.No significant stenosis of the bilateral internal carotid arteries.            Electronically Signed: Tone Sterling MD     6/9/2025 5:34 PM EDT     Workstation ID: [id number]      MRI Brain Without Contrast   Final Result   1.Findings suggestive of mild chronic microvascular ischemic change.   2.No diffusion restriction is identified to suggest acute infarct.   3.No acute abnormality is identified within the large arteries of the head or neck.   4.No significant stenosis of the bilateral internal carotid arteries.            Electronically Signed: Tone Sterling MD     6/9/2025 5:34 PM EDT     Workstation ID: [id number]      MRI Angiogram Head Without Contrast   Final Result   1.Findings suggestive of mild chronic  microvascular ischemic change.   2.No diffusion restriction is identified to suggest acute infarct.   3.No acute abnormality is identified within the large arteries of the head or neck.   4.No significant stenosis of the bilateral internal carotid arteries.            Electronically Signed: Tone Sterling MD     6/9/2025 5:34 PM EDT     Workstation ID: [id number]      XR Chest 1 View   Final Result   Impression:   No acute cardiopulmonary findings.          Electronically Signed: Cholo Oconnor MD     6/9/2025 3:10 PM EDT     Workstation ID: [id number]          I ordered and independently reviewed the above noted radiographic studies.        LABS:    I have reviewed and interpreted all of the currently available lab results from this visit (if applicable):  Results for orders placed or performed during the hospital encounter of 06/09/25   ECG 12 Lead Other; Dizziness    Collection Time: 06/09/25  2:45 PM   Result Value Ref Range    QT Interval 386 ms    QTC Interval 425 ms   Comprehensive Metabolic Panel    Collection Time: 06/09/25  5:52 PM    Specimen: Blood   Result Value Ref Range    Glucose 126 (H) 65 - 99 mg/dL    BUN 15.6 8.0 - 23.0 mg/dL    Creatinine 0.93 0.76 - 1.27 mg/dL    Sodium 141 136 - 145 mmol/L    Potassium 4.1 3.5 - 5.2 mmol/L    Chloride 103 98 - 107 mmol/L    CO2 29.0 22.0 - 29.0 mmol/L    Calcium 9.3 8.6 - 10.5 mg/dL    Total Protein 7.0 6.0 - 8.5 g/dL    Albumin 4.3 3.5 - 5.2 g/dL    ALT (SGPT) 29 1 - 41 U/L    AST (SGOT) 29 1 - 40 U/L    Alkaline Phosphatase 86 39 - 117 U/L    Total Bilirubin 0.4 0.0 - 1.2 mg/dL    Globulin 2.7 gm/dL    A/G Ratio 1.6 g/dL    BUN/Creatinine Ratio 16.8 7.0 - 25.0    Anion Gap 9.0 5.0 - 15.0 mmol/L    eGFR 91.1 >60.0 mL/min/1.73   High Sensitivity Troponin T    Collection Time: 06/09/25  5:52 PM    Specimen: Blood   Result Value Ref Range    HS Troponin T 9 <22 ng/L   Magnesium    Collection Time: 06/09/25  5:52 PM    Specimen: Blood   Result Value Ref Range     Magnesium 2.2 1.6 - 2.4 mg/dL   CBC Auto Differential    Collection Time: 06/09/25  5:52 PM    Specimen: Blood   Result Value Ref Range    WBC 8.23 3.40 - 10.80 10*3/mm3    RBC 4.94 4.14 - 5.80 10*6/mm3    Hemoglobin 14.4 13.0 - 17.7 g/dL    Hematocrit 44.5 37.5 - 51.0 %    MCV 90.1 79.0 - 97.0 fL    MCH 29.1 26.6 - 33.0 pg    MCHC 32.4 31.5 - 35.7 g/dL    RDW 12.9 12.3 - 15.4 %    RDW-SD 42.5 37.0 - 54.0 fl    MPV 9.8 6.0 - 12.0 fL    Platelets 296 140 - 450 10*3/mm3    Neutrophil % 71.5 42.7 - 76.0 %    Lymphocyte % 18.8 (L) 19.6 - 45.3 %    Monocyte % 7.2 5.0 - 12.0 %    Eosinophil % 1.8 0.3 - 6.2 %    Basophil % 0.5 0.0 - 1.5 %    Immature Grans % 0.2 0.0 - 0.5 %    Neutrophils, Absolute 5.88 1.70 - 7.00 10*3/mm3    Lymphocytes, Absolute 1.55 0.70 - 3.10 10*3/mm3    Monocytes, Absolute 0.59 0.10 - 0.90 10*3/mm3    Eosinophils, Absolute 0.15 0.00 - 0.40 10*3/mm3    Basophils, Absolute 0.04 0.00 - 0.20 10*3/mm3    Immature Grans, Absolute 0.02 0.00 - 0.05 10*3/mm3    nRBC 0.0 0.0 - 0.2 /100 WBC   Green Top (Gel)    Collection Time: 06/09/25  5:52 PM   Result Value Ref Range    Extra Tube Hold for add-ons.    Lavender Top    Collection Time: 06/09/25  5:52 PM   Result Value Ref Range    Extra Tube hold for add-on    Gold Top - SST    Collection Time: 06/09/25  5:52 PM   Result Value Ref Range    Extra Tube Hold for add-ons.    Gray Top    Collection Time: 06/09/25  5:52 PM   Result Value Ref Range    Extra Tube Hold for add-ons.    Light Blue Top    Collection Time: 06/09/25  5:52 PM   Result Value Ref Range    Extra Tube Hold for add-ons.    ECG 12 Lead Other; Dizziness    Collection Time: 06/09/25  5:53 PM   Result Value Ref Range    QT Interval 390 ms    QTC Interval 414 ms   Urinalysis With Microscopic If Indicated (No Culture) - Urine, Clean Catch    Collection Time: 06/09/25  5:57 PM    Specimen: Urine, Clean Catch   Result Value Ref Range    Color, UA Yellow Yellow, Straw    Appearance, UA Clear Clear    pH,  UA 7.0 5.0 - 8.0    Specific Gravity, UA 1.009 1.001 - 1.030    Glucose,  mg/dL (Trace) (A) Negative    Ketones, UA Negative Negative    Bilirubin, UA Negative Negative    Blood, UA Negative Negative    Protein, UA Negative Negative    Leuk Esterase, UA Negative Negative    Nitrite, UA Negative Negative    Urobilinogen, UA 1.0 E.U./dL 0.2 - 1.0 E.U./dL        If labs were ordered, I independently reviewed the results and considered them in treating the patient.      EMERGENCY DEPARTMENT COURSE and DIFFERENTIAL DIAGNOSIS/MDM:   Vitals:  AS OF 21:59 EDT    BP - 146/84  HR - 79  TEMP - 97.9 °F (36.6 °C) (Oral)  O2 SATS - 96%        Discussion below represents my analysis of pertinent findings related to patient's condition, differential diagnosis, treatment plan and final disposition.      Differential diagnosis:  The differential diagnosis associated with the patient's presentation includes: CVA, intracranial hemorrhage, intracranial mass, BPPV, labyrinthitis, electrolyte derangement, ACS, dysrhythmia      Independent interpretations (ECG/rhythm strip/X-ray/US/CT scan): I independently interpreted the patient's MRI brain and cardiac monitor.  No evidence of mass, patient is in sinus rhythm.      Additional sources:  Discussed/obtained information from independent historians:   [] Spouse:   [] Parent:   [] Friend:   [] EMS:   [] Other:  External (non-ED) record review:   [] Inpatient record:   [] Office record:   [] Outpatient record:   [] Prior Outpatient labs:   [] Prior Outpatient radiology:   [] Primary Care record:   [] Outside ED record:   [x] Other: I reviewed stress echo from 2022, EF of 60%      Patient's care impacted by:   [x] Diabetes   [x] Hypertension   [] Coronary Artery Disease   [] Cancer   [] Other:     Care significantly affected by Social Determinants of Health (housing and economic circumstances, unemployment)    [] Yes     [x] No   If yes, Patient's care significantly limited by  Social  Determinants of Health including:    [] Inadequate housing    [] Low income    [] Alcoholism and drug addiction in family    [] Problems related to primary support group    [] Unemployment    [] Problems related to employment    [] Other Social Determinants of Health:       ED Course:    ED Course as of 06/09/25 2159 Mon Jun 09, 2025   1450 Symptom onset 10 PM yesterday evening, not within the window to receive thrombolytics.  Presentation not consistent with large vessel occlusion, no indication for emergent CT imaging/stroke alert.  Will obtain MRI/MRA. [NS]   2042 On reexamination, patient remains well-appearing and nontoxic.  Resting comfortably in bed and dizziness has resolved.  It is only with movement, MRI is negative without any evidence of CVA in setting of symptoms for more than 24 hours.  Patient ambulating without difficulty.  MRA studies demonstrate no evidence of significant vascular abnormality and labs are reassuring.  Stable for discharge home with symptomatic management and outpatient follow-up. [NS]      ED Course User Index  [NS] Herrera Sage MD           I had a discussion with the patient/family regarding diagnosis, diagnostic results, treatment plan, and medications.  The patient/family indicated understanding of these instructions.  I spent adequate time at the bedside preceding discharge necessary to personally discuss the aftercare instructions, giving patient education, providing explanations of the results of our evaluations/findings, and my decision making to assure that the patient/family understand the plan of care.  Time was allotted to answer questions at that time and throughout the ED course.  Emphasis was placed on timely follow-up after discharge.  I also discussed the potential for the development of an acute emergent condition requiring further evaluation, admission, or even surgical intervention. I discussed that we found nothing during the visit today indicating the  need for further workup, admission, or the presence of an unstable medical condition.  I encouraged the patient to return to the emergency department immediately for ANY concerns, worsening, new complaints, or if symptoms persist and unable to seek follow-up in a timely fashion.  The patient/family expressed understanding and agreement with this plan.  The patient will follow-up with their PCP in 1-2 days for reevaluation.           FINAL IMPRESSION      1. Vertigo    2. Ataxia    3. History of diabetes mellitus    4. History of hypertension          DISPOSITION/PLAN     ED Disposition       ED Disposition   Discharge    Condition   Stable    Comment   --                 Comment: Please note this report has been produced using speech recognition software.      Herrera Sage MD  Attending Emergency Physician             Herrera Sage MD  06/09/25 [id number]

## 2025-06-10 LAB
QT INTERVAL: 390 MS
QTC INTERVAL: 414 MS

## 2025-06-12 LAB
QT INTERVAL: 386 MS
QTC INTERVAL: 425 MS

## 2025-06-28 LAB
QT INTERVAL: 386 MS
QT INTERVAL: 390 MS
QTC INTERVAL: 414 MS
QTC INTERVAL: 425 MS

## 2025-08-25 ENCOUNTER — OFFICE VISIT (OUTPATIENT)
Dept: ENDOCRINOLOGY | Facility: CLINIC | Age: 65
End: 2025-08-25
Payer: MEDICARE

## 2025-08-25 VITALS
OXYGEN SATURATION: 98 % | WEIGHT: 227 LBS | SYSTOLIC BLOOD PRESSURE: 140 MMHG | HEART RATE: 65 BPM | BODY MASS INDEX: 32.5 KG/M2 | HEIGHT: 70 IN | DIASTOLIC BLOOD PRESSURE: 70 MMHG

## 2025-08-25 DIAGNOSIS — E03.9 ACQUIRED HYPOTHYROIDISM: ICD-10-CM

## 2025-08-25 DIAGNOSIS — E10.65 TYPE 1 DIABETES MELLITUS WITH HYPERGLYCEMIA: Primary | ICD-10-CM

## 2025-08-25 DIAGNOSIS — Z46.81 INSULIN PUMP TITRATION: ICD-10-CM

## 2025-08-25 LAB
EXPIRATION DATE: ABNORMAL
EXPIRATION DATE: ABNORMAL
GLUCOSE BLDC GLUCOMTR-MCNC: 212 MG/DL (ref 70–130)
HBA1C MFR BLD: 8.3 % (ref 4.5–5.7)
Lab: ABNORMAL
Lab: ABNORMAL

## 2025-08-25 PROCEDURE — 1159F MED LIST DOCD IN RCRD: CPT | Performed by: INTERNAL MEDICINE

## 2025-08-25 PROCEDURE — 83036 HEMOGLOBIN GLYCOSYLATED A1C: CPT | Performed by: INTERNAL MEDICINE

## 2025-08-25 PROCEDURE — 3078F DIAST BP <80 MM HG: CPT | Performed by: INTERNAL MEDICINE

## 2025-08-25 PROCEDURE — 95251 CONT GLUC MNTR ANALYSIS I&R: CPT | Performed by: INTERNAL MEDICINE

## 2025-08-25 PROCEDURE — 3052F HG A1C>EQUAL 8.0%<EQUAL 9.0%: CPT | Performed by: INTERNAL MEDICINE

## 2025-08-25 PROCEDURE — 99214 OFFICE O/P EST MOD 30 MIN: CPT | Performed by: INTERNAL MEDICINE

## 2025-08-25 PROCEDURE — 3077F SYST BP >= 140 MM HG: CPT | Performed by: INTERNAL MEDICINE

## 2025-08-25 PROCEDURE — 1160F RVW MEDS BY RX/DR IN RCRD: CPT | Performed by: INTERNAL MEDICINE

## 2025-08-25 RX ORDER — INSULIN ASPART 100 [IU]/ML
INJECTION, SOLUTION INTRAVENOUS; SUBCUTANEOUS
Qty: 90 ML | Refills: 3 | Status: SHIPPED | OUTPATIENT
Start: 2025-08-25